# Patient Record
Sex: FEMALE | Race: WHITE | NOT HISPANIC OR LATINO
[De-identification: names, ages, dates, MRNs, and addresses within clinical notes are randomized per-mention and may not be internally consistent; named-entity substitution may affect disease eponyms.]

---

## 2017-02-21 ENCOUNTER — RX RENEWAL (OUTPATIENT)
Age: 82
End: 2017-02-21

## 2017-02-22 ENCOUNTER — RX RENEWAL (OUTPATIENT)
Age: 82
End: 2017-02-22

## 2017-05-10 ENCOUNTER — OUTPATIENT (OUTPATIENT)
Dept: OUTPATIENT SERVICES | Facility: HOSPITAL | Age: 82
LOS: 1 days | End: 2017-05-10
Payer: MEDICARE

## 2017-05-10 PROCEDURE — 78815 PET IMAGE W/CT SKULL-THIGH: CPT

## 2017-05-10 PROCEDURE — 70491 CT SOFT TISSUE NECK W/DYE: CPT

## 2017-05-10 PROCEDURE — 70491 CT SOFT TISSUE NECK W/DYE: CPT | Mod: 26

## 2017-05-10 PROCEDURE — A9552: CPT

## 2017-05-10 PROCEDURE — 78815 PET IMAGE W/CT SKULL-THIGH: CPT | Mod: 26

## 2017-05-23 ENCOUNTER — RESULT REVIEW (OUTPATIENT)
Age: 82
End: 2017-05-23

## 2017-05-23 ENCOUNTER — OUTPATIENT (OUTPATIENT)
Dept: OUTPATIENT SERVICES | Facility: HOSPITAL | Age: 82
LOS: 1 days | End: 2017-05-23
Payer: MEDICARE

## 2017-05-23 PROCEDURE — 10022: CPT | Mod: 59

## 2017-05-23 PROCEDURE — 76942 ECHO GUIDE FOR BIOPSY: CPT | Mod: 26,76

## 2017-05-23 PROCEDURE — 88173 CYTOPATH EVAL FNA REPORT: CPT

## 2017-05-23 PROCEDURE — 10022: CPT

## 2017-05-23 PROCEDURE — 76942 ECHO GUIDE FOR BIOPSY: CPT

## 2017-05-23 PROCEDURE — 88305 TISSUE EXAM BY PATHOLOGIST: CPT

## 2017-05-24 LAB
NON-GYNECOLOGICAL CYTOLOGY STUDY: SIGNIFICANT CHANGE UP
NON-GYNECOLOGICAL CYTOLOGY STUDY: SIGNIFICANT CHANGE UP

## 2017-05-31 ENCOUNTER — APPOINTMENT (OUTPATIENT)
Dept: OTOLARYNGOLOGY | Facility: CLINIC | Age: 82
End: 2017-05-31

## 2017-05-31 VITALS
DIASTOLIC BLOOD PRESSURE: 75 MMHG | HEIGHT: 63 IN | BODY MASS INDEX: 23.04 KG/M2 | WEIGHT: 130 LBS | SYSTOLIC BLOOD PRESSURE: 129 MMHG | HEART RATE: 76 BPM

## 2017-05-31 DIAGNOSIS — I25.10 ATHEROSCLEROTIC HEART DISEASE OF NATIVE CORONARY ARTERY W/OUT ANGINA PECTORIS: ICD-10-CM

## 2017-05-31 DIAGNOSIS — C79.51 SECONDARY MALIGNANT NEOPLASM OF BONE: ICD-10-CM

## 2017-05-31 DIAGNOSIS — D44.0 NEOPLASM OF UNCERTAIN BEHAVIOR OF THYROID GLAND: ICD-10-CM

## 2017-05-31 DIAGNOSIS — Z78.9 OTHER SPECIFIED HEALTH STATUS: ICD-10-CM

## 2017-05-31 DIAGNOSIS — Z80.9 FAMILY HISTORY OF MALIGNANT NEOPLASM, UNSPECIFIED: ICD-10-CM

## 2017-06-01 PROBLEM — D44.0 FOLLICULAR TUMOR OF THYROID GLAND (UNCERTAIN IF BENIGN OR MALIGNANT): Status: ACTIVE | Noted: 2017-06-01

## 2017-06-01 PROBLEM — C79.51: Status: ACTIVE | Noted: 2017-06-01

## 2017-06-05 PROBLEM — Z80.9 FAMILY HISTORY OF MALIGNANT NEOPLASM: Status: ACTIVE | Noted: 2017-05-31

## 2017-06-05 PROBLEM — I25.10 CORONARY DISEASE: Status: ACTIVE | Noted: 2017-05-31

## 2017-06-05 RX ORDER — APIXABAN 5 MG/1
5 TABLET, FILM COATED ORAL
Refills: 0 | Status: ACTIVE | COMMUNITY

## 2017-06-07 ENCOUNTER — APPOINTMENT (OUTPATIENT)
Dept: OTOLARYNGOLOGY | Facility: CLINIC | Age: 82
End: 2017-06-07

## 2017-06-07 VITALS
HEIGHT: 63 IN | SYSTOLIC BLOOD PRESSURE: 127 MMHG | DIASTOLIC BLOOD PRESSURE: 75 MMHG | WEIGHT: 130 LBS | HEART RATE: 84 BPM | BODY MASS INDEX: 23.04 KG/M2

## 2017-06-07 DIAGNOSIS — C44.301 UNSPECIFIED MALIGNANT NEOPLASM OF SKIN OF NOSE: ICD-10-CM

## 2017-06-08 VITALS
SYSTOLIC BLOOD PRESSURE: 139 MMHG | RESPIRATION RATE: 16 BRPM | HEART RATE: 80 BPM | DIASTOLIC BLOOD PRESSURE: 78 MMHG | WEIGHT: 130.07 LBS | HEIGHT: 63 IN | TEMPERATURE: 98 F

## 2017-06-08 RX ORDER — METOPROLOL TARTRATE 50 MG
1 TABLET ORAL
Qty: 0 | Refills: 0 | COMMUNITY

## 2017-06-08 NOTE — PATIENT PROFILE ADULT. - PMH
Atrial fibrillation    Hypothyroid    Pneumonia Atrial fibrillation    Bone cancer    Breast cancer  left  Hypothyroid    Pneumonia

## 2017-06-08 NOTE — PATIENT PROFILE ADULT. - PSH
S/P ablation of atrial fibrillation    S/P cataract surgery    S/P knee surgery  Right knee arthroscopy x1  S/P knee surgery  Left knee Arthrocopy X3  Surgery, elective  Thumb surgery bilateral S/P ablation of atrial fibrillation    S/P cataract surgery    S/P knee surgery  Left knee Arthrocopy X3  S/P knee surgery  Right knee arthroscopy x1  S/P lumpectomy, left breast    Surgery, elective  Thumb surgery bilateral

## 2017-06-09 ENCOUNTER — APPOINTMENT (OUTPATIENT)
Dept: OTOLARYNGOLOGY | Facility: HOSPITAL | Age: 82
End: 2017-06-09

## 2017-06-09 ENCOUNTER — RESULT REVIEW (OUTPATIENT)
Age: 82
End: 2017-06-09

## 2017-06-09 ENCOUNTER — INPATIENT (INPATIENT)
Facility: HOSPITAL | Age: 82
LOS: 4 days | Discharge: HOME | DRG: 516 | End: 2017-06-14
Attending: OTOLARYNGOLOGY | Admitting: OTOLARYNGOLOGY
Payer: MEDICARE

## 2017-06-09 DIAGNOSIS — Z98.49 CATARACT EXTRACTION STATUS, UNSPECIFIED EYE: Chronic | ICD-10-CM

## 2017-06-09 DIAGNOSIS — Z98.890 OTHER SPECIFIED POSTPROCEDURAL STATES: Chronic | ICD-10-CM

## 2017-06-09 DIAGNOSIS — Z41.9 ENCOUNTER FOR PROCEDURE FOR PURPOSES OTHER THAN REMEDYING HEALTH STATE, UNSPECIFIED: Chronic | ICD-10-CM

## 2017-06-09 DIAGNOSIS — Z90.12 ACQUIRED ABSENCE OF LEFT BREAST AND NIPPLE: Chronic | ICD-10-CM

## 2017-06-09 PROCEDURE — 71010: CPT | Mod: 26

## 2017-06-09 PROCEDURE — 41150 TONGUE MOUTH JAW SURGERY: CPT

## 2017-06-09 PROCEDURE — 14040 TIS TRNFR F/C/C/M/N/A/G/H/F: CPT

## 2017-06-09 RX ORDER — CEFAZOLIN SODIUM 1 G
1000 VIAL (EA) INJECTION EVERY 8 HOURS
Qty: 0 | Refills: 0 | Status: COMPLETED | OUTPATIENT
Start: 2017-06-09 | End: 2017-06-11

## 2017-06-09 RX ORDER — CHLORHEXIDINE GLUCONATE 213 G/1000ML
15 SOLUTION TOPICAL EVERY 6 HOURS
Qty: 0 | Refills: 0 | Status: DISCONTINUED | OUTPATIENT
Start: 2017-06-09 | End: 2017-06-14

## 2017-06-09 RX ORDER — MORPHINE SULFATE 50 MG/1
2 CAPSULE, EXTENDED RELEASE ORAL
Qty: 0 | Refills: 0 | Status: DISCONTINUED | OUTPATIENT
Start: 2017-06-09 | End: 2017-06-09

## 2017-06-09 RX ORDER — METOPROLOL TARTRATE 50 MG
25 TABLET ORAL DAILY
Qty: 0 | Refills: 0 | Status: DISCONTINUED | OUTPATIENT
Start: 2017-06-09 | End: 2017-06-13

## 2017-06-09 RX ORDER — OXYCODONE HYDROCHLORIDE 5 MG/1
5 TABLET ORAL EVERY 4 HOURS
Qty: 0 | Refills: 0 | Status: DISCONTINUED | OUTPATIENT
Start: 2017-06-09 | End: 2017-06-13

## 2017-06-09 RX ORDER — ACETAMINOPHEN 500 MG
650 TABLET ORAL EVERY 6 HOURS
Qty: 0 | Refills: 0 | Status: DISCONTINUED | OUTPATIENT
Start: 2017-06-09 | End: 2017-06-13

## 2017-06-09 RX ORDER — CEFAZOLIN SODIUM 1 G
1000 VIAL (EA) INJECTION EVERY 8 HOURS
Qty: 0 | Refills: 0 | Status: DISCONTINUED | OUTPATIENT
Start: 2017-06-09 | End: 2017-06-09

## 2017-06-09 RX ORDER — ONDANSETRON 8 MG/1
4 TABLET, FILM COATED ORAL EVERY 6 HOURS
Qty: 0 | Refills: 0 | Status: DISCONTINUED | OUTPATIENT
Start: 2017-06-09 | End: 2017-06-10

## 2017-06-09 RX ORDER — MORPHINE SULFATE 50 MG/1
2 CAPSULE, EXTENDED RELEASE ORAL
Qty: 0 | Refills: 0 | Status: DISCONTINUED | OUTPATIENT
Start: 2017-06-09 | End: 2017-06-12

## 2017-06-09 RX ORDER — SODIUM CHLORIDE 9 MG/ML
1000 INJECTION, SOLUTION INTRAVENOUS
Qty: 0 | Refills: 0 | Status: DISCONTINUED | OUTPATIENT
Start: 2017-06-09 | End: 2017-06-13

## 2017-06-09 RX ORDER — LEVOTHYROXINE SODIUM 125 MCG
75 TABLET ORAL DAILY
Qty: 0 | Refills: 0 | Status: DISCONTINUED | OUTPATIENT
Start: 2017-06-09 | End: 2017-06-13

## 2017-06-09 RX ORDER — OXYCODONE HYDROCHLORIDE 5 MG/1
8 TABLET ORAL EVERY 6 HOURS
Qty: 0 | Refills: 0 | Status: DISCONTINUED | OUTPATIENT
Start: 2017-06-09 | End: 2017-06-14

## 2017-06-09 RX ADMIN — MORPHINE SULFATE 2 MILLIGRAM(S): 50 CAPSULE, EXTENDED RELEASE ORAL at 23:42

## 2017-06-09 RX ADMIN — ONDANSETRON 104 MILLIGRAM(S): 8 TABLET, FILM COATED ORAL at 20:11

## 2017-06-09 RX ADMIN — MORPHINE SULFATE 2 MILLIGRAM(S): 50 CAPSULE, EXTENDED RELEASE ORAL at 21:24

## 2017-06-09 RX ADMIN — MORPHINE SULFATE 2 MILLIGRAM(S): 50 CAPSULE, EXTENDED RELEASE ORAL at 20:51

## 2017-06-09 RX ADMIN — MORPHINE SULFATE 2 MILLIGRAM(S): 50 CAPSULE, EXTENDED RELEASE ORAL at 20:49

## 2017-06-09 RX ADMIN — MORPHINE SULFATE 2 MILLIGRAM(S): 50 CAPSULE, EXTENDED RELEASE ORAL at 23:53

## 2017-06-09 RX ADMIN — Medication 100 MILLIGRAM(S): at 20:03

## 2017-06-09 RX ADMIN — MORPHINE SULFATE 2 MILLIGRAM(S): 50 CAPSULE, EXTENDED RELEASE ORAL at 20:31

## 2017-06-09 NOTE — PROGRESS NOTE ADULT - SUBJECTIVE AND OBJECTIVE BOX
ENT Post op check    Procedure: right SND 1-3 and right marginal mandibulectomy  Interim: seen in PACU. still tired from anesthesia. endorses jaw and neck pain. denies n/v/f/c. breathing comfortably.     PE:  NAD. no increased WOB.   no stridor  neck soft, flat. no erythema or ecchymosis. no e/o hematoma  Jaw incision intact. blood tinged saliva  NGT in place    A/P: 82F s/p right SND 1-3 and right marginal mandibulectomy  - ancef  - pain control prns  - anti emetics prns  - c/w home medications  - SQH in AM  - CRISTIANO mgmt  - oob and ambulate  - NPO

## 2017-06-09 NOTE — H&P ADULT - HISTORY OF PRESENT ILLNESS
Ms. Escamilla is an 82 year old female who recently underwent a biopsy of a right mandibular alveolar lesion surrounding a dental implant. The biopsy revealed well-differentiated squamous cell carcinoma. The patient is a nonsmoker but she has a long history of multiple lesions in the oral cavity which she treats with alternative medicine with the presumptive diagnosis of lichen planus. The patient complains of pain in the region of the recent biopsy.     Physical examination revealed a 3-cm exophytic lesion on the right alveolar ridge. It extends both buccally and lingually. It does not extend to the bottom of the mandible on the right side. There is an additional erythematous lesion, about 2.5 cm, in the right buccal mucosa as well as multiple whitish lesions on both sides of the oral cavity. There is nodularity in the right neck levels I and II.  Fiberoptic examination of the nasopharynx, oropharynx, hypopharynx, and larynx was unremarkable. The rest of the complete and comprehensive examination of the head, nose and ears was unremarkable.     I reviewed the management options with her including surgery, surgery followed with radiation therapy, radiation therapy or no treatment. I discussed advantages and disadvantages of each option. The surgery would be her best option and would include at minimum a wide resection with marginal mandibulectomy. Attempts can be made to preserve the continuity of the mandible and avoid major reconstructive procedures, but this cannot be guaranteed. Right modified neck dissection should also be performed. Risks were discussed with the patient which include but are not limited to bleeding, infection, persistent or recurrent tumor, cranial nerve injury especially cranial nerve VII, IX, X, XI, and XII, orocutaneous or pharyngocutaneous fistula, and need for additional surgery. All questions were answered and she is ready to proceed.

## 2017-06-10 RX ORDER — PREGABALIN 225 MG/1
100 CAPSULE ORAL DAILY
Qty: 0 | Refills: 0 | Status: DISCONTINUED | OUTPATIENT
Start: 2017-06-10 | End: 2017-06-13

## 2017-06-10 RX ORDER — FOLIC ACID 0.8 MG
1 TABLET ORAL DAILY
Qty: 0 | Refills: 0 | Status: DISCONTINUED | OUTPATIENT
Start: 2017-06-10 | End: 2017-06-13

## 2017-06-10 RX ORDER — HEPARIN SODIUM 5000 [USP'U]/ML
5000 INJECTION INTRAVENOUS; SUBCUTANEOUS EVERY 12 HOURS
Qty: 0 | Refills: 0 | Status: DISCONTINUED | OUTPATIENT
Start: 2017-06-10 | End: 2017-06-11

## 2017-06-10 RX ORDER — ONDANSETRON 8 MG/1
4 TABLET, FILM COATED ORAL EVERY 4 HOURS
Qty: 0 | Refills: 0 | Status: DISCONTINUED | OUTPATIENT
Start: 2017-06-10 | End: 2017-06-14

## 2017-06-10 RX ORDER — THIAMINE MONONITRATE (VIT B1) 100 MG
100 TABLET ORAL DAILY
Qty: 0 | Refills: 0 | Status: DISCONTINUED | OUTPATIENT
Start: 2017-06-10 | End: 2017-06-13

## 2017-06-10 RX ADMIN — Medication 1 TABLET(S): at 19:43

## 2017-06-10 RX ADMIN — Medication 100 MILLIGRAM(S): at 15:34

## 2017-06-10 RX ADMIN — Medication 100 MILLIGRAM(S): at 19:42

## 2017-06-10 RX ADMIN — Medication 25 MILLIGRAM(S): at 06:43

## 2017-06-10 RX ADMIN — Medication 75 MICROGRAM(S): at 06:43

## 2017-06-10 RX ADMIN — CHLORHEXIDINE GLUCONATE 15 MILLILITER(S): 213 SOLUTION TOPICAL at 06:42

## 2017-06-10 RX ADMIN — Medication 1 MILLIGRAM(S): at 19:42

## 2017-06-10 RX ADMIN — ONDANSETRON 104 MILLIGRAM(S): 8 TABLET, FILM COATED ORAL at 07:59

## 2017-06-10 RX ADMIN — CHLORHEXIDINE GLUCONATE 15 MILLILITER(S): 213 SOLUTION TOPICAL at 19:14

## 2017-06-10 RX ADMIN — Medication 100 MILLIGRAM(S): at 06:41

## 2017-06-10 RX ADMIN — HEPARIN SODIUM 5000 UNIT(S): 5000 INJECTION INTRAVENOUS; SUBCUTANEOUS at 19:13

## 2017-06-10 RX ADMIN — CHLORHEXIDINE GLUCONATE 15 MILLILITER(S): 213 SOLUTION TOPICAL at 12:17

## 2017-06-10 RX ADMIN — Medication 100 MILLIGRAM(S): at 22:14

## 2017-06-10 NOTE — PROGRESS NOTE ADULT - SUBJECTIVE AND OBJECTIVE BOX
ENT [facility] DAILY PROGRESS NOTE    Interval events: 82F s/p right SND 1-3 and right marginal mandibulectomy 6/9  POD1: Doing well. No acute e vents. pain controlled. NGT in correct position, starting TFs today.       MEDICATIONS:  Antiinfectives:   ceFAZolin   IVPB 1000milliGRAM(s) IV Intermittent every 8 hours    IV fluids:  lactated ringers. 1000milliLiter(s) IV Continuous <Continuous>    Hematologic/Anticoagulation:  heparin  Injectable 5000Unit(s) SubCutaneous every 12 hours    Pain medications/Neuro:  morphine  - Injectable 2milliGRAM(s) IV Push every 15 minutes PRN  oxyCODONE Concentrate 5milliGRAM(s) Oral every 4 hours PRN  acetaminophen    Suspension. 650milliGRAM(s) Oral every 6 hours PRN  ondansetron  IVPB 4milliGRAM(s) IV Intermittent every 6 hours PRN  oxyCODONE Solution 8milliGRAM(s) Oral every 6 hours PRN    Endocrine Medications:   levothyroxine 75MICROGram(s) Enteral Tube daily    All other standing medications:   chlorhexidine 0.12% Liquid 15milliLiter(s) Swish and Spit every 6 hours  metoprolol 25milliGRAM(s) Enteral Tube daily      Vital Signs Last 24 Hrs  T(C): 36.4, Max: 36.4 (06-10 @ 05:20)  T(F): 97.6, Max: 97.6 (06-10 @ 05:20)  HR: 88 (86 - 104)  BP: 133/73 (105/99 - 147/103)  BP(mean): 95 (88 - 95)  RR: 18 (13 - 26)  SpO2: 98% (95% - 99%)      I & Os for current day (as of 06-10 @ 08:16)  =============================================  IN:    lactated ringers.: 400 ml    IV PiggyBack: 50 ml    Total IN: 450 ml  ---------------------------------------------  OUT:    Voided: 250 ml    Drain: 90 ml    Total OUT: 340 ml  ---------------------------------------------  Total NET: 110 ml        PHYSICAL EXAM:  PE:  NAD. no increased WOB.   no stridor  neck soft, flat. no erythema or ecchymosis. no e/o hematoma  Jaw incision intact. blood tinged saliva  NGT in place  CRISTIANO in neck holding suction with SS fluid

## 2017-06-10 NOTE — PROGRESS NOTE ADULT - ASSESSMENT
82F s/p right SND 1-3 and right marginal mandibulectomy on 6/9, doing well post op  - ancef  - pain control prns  - anti emetics prns  - c/w home medications  - SQH in AM  - CRISTIANO mgmt  - oob and ambulate  - NPO with TFs through NGT

## 2017-06-10 NOTE — DIETITIAN INITIAL EVALUATION ADULT. - OTHER INFO
Pt is a 83y/o woman s/p right marginal mandibulectomy (6/9) for squamous cell carcinoma. Pt c/o of some pain. No N/V/D/C. Skin currently intact and CRISTIANO drain present. Pt has NKFA, but states she does not generally consume: bread, pasta, or meat (but she's not a vegetarian). Pt consumes EtOH almost daily. Pt states she prefers holistic and alternative medicines. Previous home supplements include (but not limited to): B1, B12, Omega 3, Vitamin C, Ca, Mg, Zn, Lutein. Current TF order: Jveity 1.2 @ 50mL/hr x 24hrs via NGT  = 1440kcla, 66g pro, 1200mL total volume, 968mL Free water, 100% RDIs. RD provided edu to pt regarding TF and nutrition goals.

## 2017-06-10 NOTE — DIETITIAN INITIAL EVALUATION ADULT. - NS AS NUTRI INTERV ENTERAL NUTRITION
Increase to: Jevity 1.2 @ 55mL/hr x 24hrs via NGT= 1584kcal, 73g pro(1.2g/kg), 1320mL total volume, 1065mL free water, 100% RDIs./Volume

## 2017-06-10 NOTE — DIETITIAN INITIAL EVALUATION ADULT. - ENERGY NEEDS
Ht: 63" IBW: 52kg, %IBW: 113. BMI: 23  ABW used for calculations as pt between % of IBW.   Needs estimated for Ca dx and s/p OR.

## 2017-06-11 LAB
ANION GAP SERPL CALC-SCNC: 10 MMOL/L — SIGNIFICANT CHANGE UP (ref 5–17)
BUN SERPL-MCNC: 14 MG/DL — SIGNIFICANT CHANGE UP (ref 7–23)
CALCIUM SERPL-MCNC: 9.2 MG/DL — SIGNIFICANT CHANGE UP (ref 8.4–10.5)
CHLORIDE SERPL-SCNC: 101 MMOL/L — SIGNIFICANT CHANGE UP (ref 96–108)
CO2 SERPL-SCNC: 27 MMOL/L — SIGNIFICANT CHANGE UP (ref 22–31)
CREAT SERPL-MCNC: 0.7 MG/DL — SIGNIFICANT CHANGE UP (ref 0.5–1.3)
GLUCOSE SERPL-MCNC: 90 MG/DL — SIGNIFICANT CHANGE UP (ref 70–99)
MAGNESIUM SERPL-MCNC: 1.8 MG/DL — SIGNIFICANT CHANGE UP (ref 1.6–2.6)
PHOSPHATE SERPL-MCNC: 1.9 MG/DL — LOW (ref 2.5–4.5)
POTASSIUM SERPL-MCNC: 4.1 MMOL/L — SIGNIFICANT CHANGE UP (ref 3.5–5.3)
POTASSIUM SERPL-SCNC: 4.1 MMOL/L — SIGNIFICANT CHANGE UP (ref 3.5–5.3)
SODIUM SERPL-SCNC: 138 MMOL/L — SIGNIFICANT CHANGE UP (ref 135–145)

## 2017-06-11 RX ORDER — APIXABAN 2.5 MG/1
2.5 TABLET, FILM COATED ORAL
Qty: 0 | Refills: 0 | Status: DISCONTINUED | OUTPATIENT
Start: 2017-06-11 | End: 2017-06-13

## 2017-06-11 RX ADMIN — CHLORHEXIDINE GLUCONATE 15 MILLILITER(S): 213 SOLUTION TOPICAL at 11:32

## 2017-06-11 RX ADMIN — Medication 1 TABLET(S): at 11:32

## 2017-06-11 RX ADMIN — CHLORHEXIDINE GLUCONATE 15 MILLILITER(S): 213 SOLUTION TOPICAL at 06:59

## 2017-06-11 RX ADMIN — Medication 25 MILLIGRAM(S): at 07:00

## 2017-06-11 RX ADMIN — Medication 1 MILLIGRAM(S): at 11:32

## 2017-06-11 RX ADMIN — SODIUM CHLORIDE 80 MILLILITER(S): 9 INJECTION, SOLUTION INTRAVENOUS at 17:36

## 2017-06-11 RX ADMIN — APIXABAN 2.5 MILLIGRAM(S): 2.5 TABLET, FILM COATED ORAL at 17:26

## 2017-06-11 RX ADMIN — Medication 100 MILLIGRAM(S): at 11:32

## 2017-06-11 RX ADMIN — PREGABALIN 100 MICROGRAM(S): 225 CAPSULE ORAL at 06:59

## 2017-06-11 RX ADMIN — SODIUM CHLORIDE 80 MILLILITER(S): 9 INJECTION, SOLUTION INTRAVENOUS at 00:43

## 2017-06-11 RX ADMIN — HEPARIN SODIUM 5000 UNIT(S): 5000 INJECTION INTRAVENOUS; SUBCUTANEOUS at 06:59

## 2017-06-11 RX ADMIN — CHLORHEXIDINE GLUCONATE 15 MILLILITER(S): 213 SOLUTION TOPICAL at 00:44

## 2017-06-11 RX ADMIN — Medication 100 MILLIGRAM(S): at 14:45

## 2017-06-11 RX ADMIN — Medication 75 MICROGRAM(S): at 07:00

## 2017-06-11 NOTE — PROGRESS NOTE ADULT - ASSESSMENT
82F s/p right SND 1-3 and right marginal mandibulectomy on 6/9, doing well post op  - ancef  - pain control prns  - anti emetics prns  - c/w home medications  - CRISTIANO mgmt  - oob and ambulate  - NPO with TFs through NGT 82F s/p right SND 1-3 and right marginal mandibulectomy on 6/9, doing well post op  - ancef  - pain control prns  - anti emetics prns  - restart eloquis  - c/w home medications  - CRISTIANO mgmt  - oob and ambulate  - NPO with TFs through NGT

## 2017-06-11 NOTE — PROGRESS NOTE ADULT - SUBJECTIVE AND OBJECTIVE BOX
ENT [facility] DAILY PROGRESS NOTE    Interval events: 82F s/p right SND 1-3 and right marginal mandibulectomy 6/9  POD1: Doing well. No acute e vents. pain controlled. NGT in correct position, starting TFs today.   POD2: TFs at goal. pain controlled. Slept well last night. No acute events    MEDICATIONS:  Antiinfectives:   ceFAZolin   IVPB 1000milliGRAM(s) IV Intermittent every 8 hours    IV fluids:  lactated ringers. 1000milliLiter(s) IV Continuous <Continuous>  cyanocobalamin 100MICROGram(s) Oral daily  thiamine 100milliGRAM(s) Oral daily  folic acid 1milliGRAM(s) Oral daily  multivitamin 1Tablet(s) Oral daily    Hematologic/Anticoagulation:  heparin  Injectable 5000Unit(s) SubCutaneous every 12 hours    Pain medications/Neuro:  morphine  - Injectable 2milliGRAM(s) IV Push every 15 minutes PRN  oxyCODONE Concentrate 5milliGRAM(s) Oral every 4 hours PRN  acetaminophen    Suspension. 650milliGRAM(s) Oral every 6 hours PRN  oxyCODONE Solution 8milliGRAM(s) Oral every 6 hours PRN  ondansetron Injectable 4milliGRAM(s) IV Push every 4 hours PRN    Endocrine Medications:   levothyroxine 75MICROGram(s) Enteral Tube daily    All other standing medications:   chlorhexidine 0.12% Liquid 15milliLiter(s) Swish and Spit every 6 hours  metoprolol 25milliGRAM(s) Enteral Tube daily        Vital Signs Last 24 Hrs  T(C): 37, Max: 37.2 (06-11 @ 05:14)  T(F): 98.6, Max: 99 (06-11 @ 05:14)  HR: 102 (80 - 102)  BP: 156/109 (122/76 - 156/109)  BP(mean): 127 (94 - 127)  RR: 17 (16 - 20)  SpO2: 93% (93% - 99%)    I & Os for 24h ending 06-11 @ 07:00  =============================================  IN:    lactated ringers.: 1665 ml    Free Water: 600 ml    Jevity: 210 ml    Enteral Tube Flush: 160 ml    IV PiggyBack: 100 ml    Total IN: 2735 ml  ---------------------------------------------  OUT:    Voided: 1650 ml    Drain: 100 ml    Total OUT: 1750 ml  ---------------------------------------------  Total NET: 985 ml    I & Os for current day (as of 06-11 @ 10:15)  =============================================  IN:    Total IN: 0 ml  ---------------------------------------------  OUT:    Voided: 500 ml    Drain: 30 ml    Total OUT: 530 ml  ---------------------------------------------  Total NET: -530 ml        PHYSICAL EXAM:    PE:  NAD. no increased WOB.   no stridor  neck soft, flat. no erythema or ecchymosis. no e/o hematoma  Jaw incision intact. blood tinged saliva  NGT in place  CRISTIANO in neck holding suction with SS fluid

## 2017-06-12 RX ORDER — SODIUM,POTASSIUM PHOSPHATES 278-250MG
1 POWDER IN PACKET (EA) ORAL
Qty: 0 | Refills: 0 | Status: COMPLETED | OUTPATIENT
Start: 2017-06-12 | End: 2017-06-13

## 2017-06-12 RX ADMIN — Medication 1 MILLIGRAM(S): at 10:38

## 2017-06-12 RX ADMIN — CHLORHEXIDINE GLUCONATE 15 MILLILITER(S): 213 SOLUTION TOPICAL at 06:51

## 2017-06-12 RX ADMIN — Medication 1 TABLET(S): at 10:38

## 2017-06-12 RX ADMIN — Medication 25 MILLIGRAM(S): at 06:51

## 2017-06-12 RX ADMIN — PREGABALIN 100 MICROGRAM(S): 225 CAPSULE ORAL at 10:38

## 2017-06-12 RX ADMIN — Medication 1 TABLET(S): at 17:13

## 2017-06-12 RX ADMIN — CHLORHEXIDINE GLUCONATE 15 MILLILITER(S): 213 SOLUTION TOPICAL at 10:37

## 2017-06-12 RX ADMIN — CHLORHEXIDINE GLUCONATE 15 MILLILITER(S): 213 SOLUTION TOPICAL at 00:57

## 2017-06-12 RX ADMIN — APIXABAN 2.5 MILLIGRAM(S): 2.5 TABLET, FILM COATED ORAL at 17:25

## 2017-06-12 RX ADMIN — Medication 75 MICROGRAM(S): at 06:51

## 2017-06-12 RX ADMIN — CHLORHEXIDINE GLUCONATE 15 MILLILITER(S): 213 SOLUTION TOPICAL at 17:13

## 2017-06-12 RX ADMIN — SODIUM CHLORIDE 80 MILLILITER(S): 9 INJECTION, SOLUTION INTRAVENOUS at 09:33

## 2017-06-12 RX ADMIN — Medication 1 TABLET(S): at 22:31

## 2017-06-12 RX ADMIN — Medication 100 MILLIGRAM(S): at 10:38

## 2017-06-12 RX ADMIN — APIXABAN 2.5 MILLIGRAM(S): 2.5 TABLET, FILM COATED ORAL at 06:51

## 2017-06-12 NOTE — PROGRESS NOTE ADULT - SUBJECTIVE AND OBJECTIVE BOX
ENT [facility] DAILY PROGRESS NOTE    Overnight events/Interval HPI: 82F s/p right SND 1-3 and right marginal mandibulectomy 6/9  POD1: Doing well. No acute e vents. pain controlled. NGT in correct position, starting TFs today.   POD2: TFs at goal. pain controlled. Slept well last night. No acute events  POD3: No acute events overnight, tolerating TF, CRISTIANO to high to meet criteria for removal      Allergies  sulfa drugs (Other)      MEDICATIONS:  Antiinfectives:     IV fluids:  lactated ringers. 1000milliLiter(s) IV Continuous <Continuous>  cyanocobalamin 100MICROGram(s) Oral daily  thiamine 100milliGRAM(s) Oral daily  folic acid 1milliGRAM(s) Oral daily  multivitamin 1Tablet(s) Oral daily    Hematologic/Anticoagulation:  apixaban 2.5milliGRAM(s) Oral two times a day    Pain medications/Neuro:  morphine  - Injectable 2milliGRAM(s) IV Push every 15 minutes PRN  oxyCODONE Concentrate 5milliGRAM(s) Oral every 4 hours PRN  acetaminophen    Suspension. 650milliGRAM(s) Oral every 6 hours PRN  oxyCODONE Solution 8milliGRAM(s) Oral every 6 hours PRN  ondansetron Injectable 4milliGRAM(s) IV Push every 4 hours PRN    Endocrine Medications:   levothyroxine 75MICROGram(s) Enteral Tube daily    All other standing medications:   chlorhexidine 0.12% Liquid 15milliLiter(s) Swish and Spit every 6 hours  metoprolol 25milliGRAM(s) Enteral Tube daily    All other PRN medications:      Vital Signs Last 24 Hrs  T(C): 36.3, Max: 36.8 (06-11 @ 21:40)  T(F): 97.4, Max: 98.2 (06-11 @ 21:40)  HR: 82 (82 - 104)  BP: 148/66 (142/79 - 160/99)  BP(mean): 111 (104 - 121)  RR: 18 (15 - 19)  SpO2: 96% (92% - 96%)    I & Os for 24h ending 06-12 @ 07:00  =============================================  IN:    lactated ringers.: 1840 ml    Jevity: 1150 ml    Enteral Tube Flush: 600 ml    Free Water: 600 ml    Total IN: 4190 ml  ---------------------------------------------  OUT:    Voided: 3500 ml    Drain: 90 ml    Stool: 4 ml    Total OUT: 3594 ml  ---------------------------------------------  Total NET: 596 ml    I & Os for current day (as of 06-12 @ 11:28)  =============================================  IN:    lactated ringers.: 240 ml    Jevity: 150 ml    Total IN: 390 ml  ---------------------------------------------  OUT:    Voided: 400 ml    Total OUT: 400 ml  ---------------------------------------------  Total NET: -10 ml        PHYSICAL EXAM:    ENT EXAM-   Constitutional: Well-developed, well-nourished.  No hoarseness.     Head:  normocephalic, atraumatic.   NAD. no increased WOB.   no stridor  neck soft, flat. no erythema or ecchymosis. no e/o hematoma  Jaw incision intact. blood tinged saliva  NGT in place  CRISTIANO in neck holding suction with SS fluid    LABS:  CBC-    BMP/CMP-  11 Jun 2017 11:13    138    |  101    |  14     ----------------------------<  90     4.1     |  27     |  0.70     Ca    9.2        11 Jun 2017 11:13  Phos  1.9       11 Jun 2017 11:13  Mg     1.8       11 Jun 2017 11:13      Assessment/Plan:  82F s/p right SND 1-3 and right marginal mandibulectomy on 6/9, doing well post op  - ancef  - pain control prns  - anti emetics prns  - c/w eloquis  - c/w home medications  - CRISTIANO mgmt  - oob and ambulate  - NPO with TFs through NGT  - plan to start on CLD tomorrow    - d/w attending MD who agrees with the above plan and also personally examined pt at bedside        PPX: SCDs, DVT ppx with SUBQ hep.    Dispo planning:   -Home care is not needed ENT [facility] DAILY PROGRESS NOTE    Overnight events/Interval HPI: 82F s/p right SND 1-3 and right marginal mandibulectomy 6/9  POD1: Doing well. No acute e vents. pain controlled. NGT in correct position, starting TFs today.   POD2: TFs at goal. pain controlled. Slept well last night. No acute events  POD3: No acute events overnight, tolerating TF, CRISTIANO to high to meet criteria for removal      Allergies  sulfa drugs (Other)      MEDICATIONS:  Antiinfectives:     IV fluids:  lactated ringers. 1000milliLiter(s) IV Continuous <Continuous>  cyanocobalamin 100MICROGram(s) Oral daily  thiamine 100milliGRAM(s) Oral daily  folic acid 1milliGRAM(s) Oral daily  multivitamin 1Tablet(s) Oral daily    Hematologic/Anticoagulation:  apixaban 2.5milliGRAM(s) Oral two times a day    Pain medications/Neuro:  morphine  - Injectable 2milliGRAM(s) IV Push every 15 minutes PRN  oxyCODONE Concentrate 5milliGRAM(s) Oral every 4 hours PRN  acetaminophen    Suspension. 650milliGRAM(s) Oral every 6 hours PRN  oxyCODONE Solution 8milliGRAM(s) Oral every 6 hours PRN  ondansetron Injectable 4milliGRAM(s) IV Push every 4 hours PRN    Endocrine Medications:   levothyroxine 75MICROGram(s) Enteral Tube daily    All other standing medications:   chlorhexidine 0.12% Liquid 15milliLiter(s) Swish and Spit every 6 hours  metoprolol 25milliGRAM(s) Enteral Tube daily    All other PRN medications:      Vital Signs Last 24 Hrs  T(C): 36.3, Max: 36.8 (06-11 @ 21:40)  T(F): 97.4, Max: 98.2 (06-11 @ 21:40)  HR: 82 (82 - 104)  BP: 148/66 (142/79 - 160/99)  BP(mean): 111 (104 - 121)  RR: 18 (15 - 19)  SpO2: 96% (92% - 96%)    I & Os for 24h ending 06-12 @ 07:00  =============================================  IN:    lactated ringers.: 1840 ml    Jevity: 1150 ml    Enteral Tube Flush: 600 ml    Free Water: 600 ml    Total IN: 4190 ml  ---------------------------------------------  OUT:    Voided: 3500 ml    Drain: 90 ml    Stool: 4 ml    Total OUT: 3594 ml  ---------------------------------------------  Total NET: 596 ml    I & Os for current day (as of 06-12 @ 11:28)  =============================================  IN:    lactated ringers.: 240 ml    Jevity: 150 ml    Total IN: 390 ml  ---------------------------------------------  OUT:    Voided: 400 ml    Total OUT: 400 ml  ---------------------------------------------  Total NET: -10 ml        PHYSICAL EXAM:    ENT EXAM-   Constitutional: Well-developed, well-nourished.  No hoarseness.     Head:  normocephalic, atraumatic.   NAD. no increased WOB.   no stridor  neck soft, flat. no erythema or ecchymosis. no e/o hematoma  Jaw incision intact. blood tinged saliva  NGT in place  CRISTIANO in neck holding suction with SS fluid    LABS:  CBC-    BMP/CMP-  11 Jun 2017 11:13    138    |  101    |  14     ----------------------------<  90     4.1     |  27     |  0.70     Ca    9.2        11 Jun 2017 11:13  Phos  1.9       11 Jun 2017 11:13  Mg     1.8       11 Jun 2017 11:13      Assessment/Plan:  82F s/p right SND 1-3 and right marginal mandibulectomy on 6/9, doing well post op  - ancef  - pain control prns  - anti emetics prns  - c/w eloquis  - c/w home medications  - CRISTIANO mgmt  - oob and ambulate  - NPO with TFs through NGT  - plan to start on CLD tomorrow    - d/w attending MD who agrees with the above plan         PPX: SCDs, DVT ppx with SUBQ hep.    Dispo planning:   -Home care is not needed

## 2017-06-13 LAB
ANION GAP SERPL CALC-SCNC: 12 MMOL/L — SIGNIFICANT CHANGE UP (ref 5–17)
BUN SERPL-MCNC: 9 MG/DL — SIGNIFICANT CHANGE UP (ref 7–23)
CALCIUM SERPL-MCNC: 9.7 MG/DL — SIGNIFICANT CHANGE UP (ref 8.4–10.5)
CHLORIDE SERPL-SCNC: 98 MMOL/L — SIGNIFICANT CHANGE UP (ref 96–108)
CO2 SERPL-SCNC: 27 MMOL/L — SIGNIFICANT CHANGE UP (ref 22–31)
CREAT SERPL-MCNC: 0.6 MG/DL — SIGNIFICANT CHANGE UP (ref 0.5–1.3)
GLUCOSE SERPL-MCNC: 109 MG/DL — HIGH (ref 70–99)
MAGNESIUM SERPL-MCNC: 1.8 MG/DL — SIGNIFICANT CHANGE UP (ref 1.6–2.6)
PHOSPHATE SERPL-MCNC: 3.5 MG/DL — SIGNIFICANT CHANGE UP (ref 2.5–4.5)
POTASSIUM SERPL-MCNC: 3.7 MMOL/L — SIGNIFICANT CHANGE UP (ref 3.5–5.3)
POTASSIUM SERPL-SCNC: 3.7 MMOL/L — SIGNIFICANT CHANGE UP (ref 3.5–5.3)
SODIUM SERPL-SCNC: 137 MMOL/L — SIGNIFICANT CHANGE UP (ref 135–145)

## 2017-06-13 RX ORDER — APIXABAN 2.5 MG/1
2.5 TABLET, FILM COATED ORAL
Qty: 0 | Refills: 0 | Status: DISCONTINUED | OUTPATIENT
Start: 2017-06-13 | End: 2017-06-14

## 2017-06-13 RX ORDER — OXYCODONE HYDROCHLORIDE 5 MG/1
5 TABLET ORAL EVERY 4 HOURS
Qty: 0 | Refills: 0 | Status: DISCONTINUED | OUTPATIENT
Start: 2017-06-13 | End: 2017-06-14

## 2017-06-13 RX ORDER — METOPROLOL TARTRATE 50 MG
25 TABLET ORAL DAILY
Qty: 0 | Refills: 0 | Status: DISCONTINUED | OUTPATIENT
Start: 2017-06-13 | End: 2017-06-14

## 2017-06-13 RX ORDER — ACETAMINOPHEN 500 MG
650 TABLET ORAL EVERY 6 HOURS
Qty: 0 | Refills: 0 | Status: DISCONTINUED | OUTPATIENT
Start: 2017-06-13 | End: 2017-06-14

## 2017-06-13 RX ORDER — PREGABALIN 225 MG/1
100 CAPSULE ORAL DAILY
Qty: 0 | Refills: 0 | Status: DISCONTINUED | OUTPATIENT
Start: 2017-06-13 | End: 2017-06-14

## 2017-06-13 RX ORDER — LEVOTHYROXINE SODIUM 125 MCG
75 TABLET ORAL DAILY
Qty: 0 | Refills: 0 | Status: DISCONTINUED | OUTPATIENT
Start: 2017-06-13 | End: 2017-06-14

## 2017-06-13 RX ORDER — FOLIC ACID 0.8 MG
1 TABLET ORAL DAILY
Qty: 0 | Refills: 0 | Status: DISCONTINUED | OUTPATIENT
Start: 2017-06-13 | End: 2017-06-14

## 2017-06-13 RX ORDER — OXYCODONE HYDROCHLORIDE 5 MG/1
5 TABLET ORAL EVERY 4 HOURS
Qty: 0 | Refills: 0 | Status: DISCONTINUED | OUTPATIENT
Start: 2017-06-13 | End: 2017-06-13

## 2017-06-13 RX ORDER — THIAMINE MONONITRATE (VIT B1) 100 MG
100 TABLET ORAL DAILY
Qty: 0 | Refills: 0 | Status: DISCONTINUED | OUTPATIENT
Start: 2017-06-13 | End: 2017-06-14

## 2017-06-13 RX ADMIN — Medication 1 TABLET(S): at 12:50

## 2017-06-13 RX ADMIN — PREGABALIN 100 MICROGRAM(S): 225 CAPSULE ORAL at 11:19

## 2017-06-13 RX ADMIN — Medication 25 MILLIGRAM(S): at 11:19

## 2017-06-13 RX ADMIN — APIXABAN 2.5 MILLIGRAM(S): 2.5 TABLET, FILM COATED ORAL at 17:29

## 2017-06-13 RX ADMIN — CHLORHEXIDINE GLUCONATE 15 MILLILITER(S): 213 SOLUTION TOPICAL at 06:37

## 2017-06-13 RX ADMIN — CHLORHEXIDINE GLUCONATE 15 MILLILITER(S): 213 SOLUTION TOPICAL at 00:54

## 2017-06-13 RX ADMIN — CHLORHEXIDINE GLUCONATE 15 MILLILITER(S): 213 SOLUTION TOPICAL at 11:17

## 2017-06-13 RX ADMIN — Medication 1 TABLET(S): at 09:16

## 2017-06-13 RX ADMIN — CHLORHEXIDINE GLUCONATE 15 MILLILITER(S): 213 SOLUTION TOPICAL at 17:30

## 2017-06-13 RX ADMIN — Medication 75 MICROGRAM(S): at 11:20

## 2017-06-13 RX ADMIN — CHLORHEXIDINE GLUCONATE 15 MILLILITER(S): 213 SOLUTION TOPICAL at 23:27

## 2017-06-13 RX ADMIN — Medication 100 MILLIGRAM(S): at 11:17

## 2017-06-13 RX ADMIN — Medication 1 TABLET(S): at 11:17

## 2017-06-13 RX ADMIN — Medication 1 MILLIGRAM(S): at 11:17

## 2017-06-13 NOTE — PROGRESS NOTE ADULT - SUBJECTIVE AND OBJECTIVE BOX
ENT [facility] DAILY PROGRESS NOTE    Overnight events/Interval HPI: 82F s/p right SND 1-3 and right marginal mandibulectomy 6/9  POD1: Doing well. No acute e vents. pain controlled. NGT in correct position, starting TFs today.   POD2: TFs at goal. pain controlled. Slept well last night. No acute events  POD3: No acute events overnight, tolerating TF, CRISTIANO to high to meet criteria for removal  POD4: NGT removed, CRISTIANO drain removed, pt started on clear liquid diet, pain well controlled      Allergies  sulfa drugs (Other)      MEDICATIONS:  Antiinfectives:     IV fluids:  cyanocobalamin 100MICROGram(s) Oral daily  thiamine 100milliGRAM(s) Oral daily  folic acid 1milliGRAM(s) Oral daily  multivitamin 1Tablet(s) Oral daily  potassium acid phosphate/sodium acid phosphate tablet (K-PHOS No. 2) 1Tablet(s) Oral four times a day with meals    Hematologic/Anticoagulation:  apixaban 2.5milliGRAM(s) Oral two times a day    Pain medications/Neuro:  oxyCODONE Concentrate 5milliGRAM(s) Oral every 4 hours PRN  acetaminophen    Suspension. 650milliGRAM(s) Oral every 6 hours PRN  oxyCODONE Solution 8milliGRAM(s) Oral every 6 hours PRN  ondansetron Injectable 4milliGRAM(s) IV Push every 4 hours PRN    Endocrine Medications:   levothyroxine 75MICROGram(s) Enteral Tube daily    All other standing medications:   chlorhexidine 0.12% Liquid 15milliLiter(s) Swish and Spit every 6 hours  metoprolol 25milliGRAM(s) Enteral Tube daily    All other PRN medications:      Vital Signs Last 24 Hrs  T(C): 36.4, Max: 36.5 (06-12 @ 18:00)  T(F): 97.6, Max: 97.7 (06-12 @ 18:00)  HR: 70 (70 - 102)  BP: 156/99 (143/78 - 156/99)  BP(mean): 122 (105 - 122)  RR: 16 (16 - 18)  SpO2: 94% (92% - 96%)      I & Os for current day (as of 06-13 @ 10:37)  =============================================  IN:    lactated ringers.: 1600 ml    Jevity: 1050 ml    Free Water: 1000 ml    Total IN: 3650 ml  ---------------------------------------------  OUT:    Voided: 1650 ml    Drain: 40 ml    Stool: 1 ml    Total OUT: 1691 ml  ---------------------------------------------  Total NET: 1959 ml        PHYSICAL EXAM:    ENT EXAM-   Constitutional: Well-developed, well-nourished.  No hoarseness.     Head:  normocephalic, atraumatic.   NAD. no increased WOB.   no stridor  neck soft, flat. no erythema or ecchymosis. no e/o hematoma  Jaw incision intact. blood tinged saliva  NGT in place  CRISTIANO in neck holding suction with SS fluid    LABS:  CBC-    BMP/CMP-  13 Jun 2017 07:19    137    |  98     |  9      ----------------------------<  109    3.7     |  27     |  0.60     Ca    9.7        13 Jun 2017 07:19  Phos  3.5       13 Jun 2017 07:19  Mg     1.8       13 Jun 2017 07:19      Coagulation Studies-    Endocrine Panel-  Calcium, Total Serum: 9.7 mg/dL (06-13 @ 07:19)        Assessment/Plan:  82y Female s/p right SND 1-3 and right marginal mandibulectomy on 6/9, doing well post op  - ancef  - pain control prns  - anti emetics prns  - c/w eloquis  - c/w home medications  - CRISTIANO drain removed  - oob and ambulate  - started on clear liquid diet  - will plan on dispo tomorrow depending on how pt tolerates diet today    - d/w attending MD Dr. Maguire who agrees with the above plan and also physically examined pt      PPX: SCDs, DVT ppx with SUBQ hep.    Dispo planning:   -Home care for post op wound checks, pt lives alone but will be staying with friends for first several days post op

## 2017-06-14 ENCOUNTER — TRANSCRIPTION ENCOUNTER (OUTPATIENT)
Age: 82
End: 2017-06-14

## 2017-06-14 VITALS
DIASTOLIC BLOOD PRESSURE: 76 MMHG | SYSTOLIC BLOOD PRESSURE: 123 MMHG | RESPIRATION RATE: 16 BRPM | TEMPERATURE: 98 F | OXYGEN SATURATION: 95 % | HEART RATE: 92 BPM

## 2017-06-14 PROCEDURE — 88331 PATH CONSLTJ SURG 1 BLK 1SPC: CPT

## 2017-06-14 PROCEDURE — 88305 TISSUE EXAM BY PATHOLOGIST: CPT

## 2017-06-14 PROCEDURE — 80048 BASIC METABOLIC PNL TOTAL CA: CPT

## 2017-06-14 PROCEDURE — 86900 BLOOD TYPING SEROLOGIC ABO: CPT

## 2017-06-14 PROCEDURE — 88300 SURGICAL PATH GROSS: CPT

## 2017-06-14 PROCEDURE — 86850 RBC ANTIBODY SCREEN: CPT

## 2017-06-14 PROCEDURE — 86901 BLOOD TYPING SEROLOGIC RH(D): CPT

## 2017-06-14 PROCEDURE — 36415 COLL VENOUS BLD VENIPUNCTURE: CPT

## 2017-06-14 PROCEDURE — 84100 ASSAY OF PHOSPHORUS: CPT

## 2017-06-14 PROCEDURE — 88309 TISSUE EXAM BY PATHOLOGIST: CPT

## 2017-06-14 PROCEDURE — 83735 ASSAY OF MAGNESIUM: CPT

## 2017-06-14 PROCEDURE — 71045 X-RAY EXAM CHEST 1 VIEW: CPT

## 2017-06-14 RX ORDER — PREGABALIN 225 MG/1
1 CAPSULE ORAL
Qty: 30 | Refills: 0 | OUTPATIENT
Start: 2017-06-14 | End: 2017-07-14

## 2017-06-14 RX ORDER — APIXABAN 2.5 MG/1
0 TABLET, FILM COATED ORAL
Qty: 0 | Refills: 0 | COMMUNITY

## 2017-06-14 RX ORDER — THIAMINE MONONITRATE (VIT B1) 100 MG
1 TABLET ORAL
Qty: 30 | Refills: 0 | OUTPATIENT
Start: 2017-06-14 | End: 2017-07-14

## 2017-06-14 RX ORDER — DOCUSATE SODIUM 100 MG
1 CAPSULE ORAL
Qty: 60 | Refills: 0 | OUTPATIENT
Start: 2017-06-14 | End: 2017-07-14

## 2017-06-14 RX ORDER — LEVOTHYROXINE SODIUM 125 MCG
0 TABLET ORAL
Qty: 0 | Refills: 0 | COMMUNITY

## 2017-06-14 RX ORDER — METOPROLOL TARTRATE 50 MG
0 TABLET ORAL
Qty: 0 | Refills: 0 | COMMUNITY

## 2017-06-14 RX ORDER — CHLORHEXIDINE GLUCONATE 213 G/1000ML
5 SOLUTION TOPICAL
Qty: 1 | Refills: 0 | OUTPATIENT
Start: 2017-06-14

## 2017-06-14 RX ORDER — FOLIC ACID 0.8 MG
1 TABLET ORAL
Qty: 30 | Refills: 0 | OUTPATIENT
Start: 2017-06-14 | End: 2017-07-14

## 2017-06-14 RX ADMIN — Medication 25 MILLIGRAM(S): at 06:03

## 2017-06-14 RX ADMIN — Medication 75 MICROGRAM(S): at 06:03

## 2017-06-14 RX ADMIN — APIXABAN 2.5 MILLIGRAM(S): 2.5 TABLET, FILM COATED ORAL at 06:03

## 2017-06-14 RX ADMIN — CHLORHEXIDINE GLUCONATE 15 MILLILITER(S): 213 SOLUTION TOPICAL at 06:03

## 2017-06-14 NOTE — DISCHARGE NOTE ADULT - PATIENT PORTAL LINK FT
“You can access the [employer] Patient Portal, offered by [facility], by registering with the following website: [email or web address]”

## 2017-06-14 NOTE — DISCHARGE NOTE ADULT - CARE PLAN
Principal Discharge DX:	Bone cancer  Goal:	Recover from surgery  Instructions for follow-up, activity and diet:	1.	Report any fever, chills, difficulty breathing, difficulty swallowing, and change in your voice, bleeding or purulent drainage from your incision sites, or any significant swelling to your neck to the doctor immediately.  2.	Diet: Pureed diet  3.	Activity: no heavy lifting, do not lift anything heavier than a gallon of milk until after you follow up appointment with your doctor, no strenuous activity such as running or biking.  4.	Shower/Bathing: you shower starting 48 hrs after you procedure, after 48 hrs you may wash your hair and shower but do not scrub at your neck incision  5.	Wound care: keep your incision site clean and dry   6.	Take all medications as prescribed.   7.	Continue all of your normal home medications unless told otherwise.  8.	Avoid any NSAIDS or ibuprofen/asa containing products you may take Tylenol for mild pain.

## 2017-06-14 NOTE — DISCHARGE NOTE ADULT - CARE PROVIDER_API CALL
Andrea Bright), Otolaryngology  [address] [place] [address]  Phone: [phone]  Fax: [phone]    Jorje Casanova), [facility]; Otolaryngology  [address] [place] [address]  Phone: [phone]  Fax: [phone]

## 2017-06-14 NOTE — DISCHARGE NOTE ADULT - CARE PROVIDERS DIRECT ADDRESSES
[email or web address]

## 2017-06-14 NOTE — DISCHARGE NOTE ADULT - HOSPITAL COURSE
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 Drain was placed intraop.   POD1: Doing well. No acute e vents. pain controlled. NGT in correct position, starting TFs today.   POD2: TFs at goal. pain controlled. Slept well last night. 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 Drain was placed intraop.   POD1: Doing well. No acute e vents. pain controlled. NGT in correct position, starting TFs today.   POD2: TFs at goal. pain controlled. Slept well last night. No acute events  POD3: No acute events overnight, tolerating TF, CRISTIANO to high to meet criteria for removal  POD4: NGT removed, CRISTIANO drain removed, pt started on clear liquid diet, pain well controlled  POD5: Tolerating diet, ambulating, pain controlled, safe for discharge    Discharge Exam  ENT EXAM-   Constitutional: Well-developed, well-nourished.  No hoarseness.     Head:  normocephalic, atraumatic.   NAD. no increased WOB.   no stridor  neck soft, flat. no erythema or ecchymosis. no e/o hematoma  Jaw incision intact.

## 2017-06-14 NOTE — DISCHARGE NOTE ADULT - PLAN OF CARE
Recover from surgery 1.	Report any fever, chills, difficulty breathing, difficulty swallowing, and change in your voice, bleeding or purulent drainage from your incision sites, or any significant swelling to your neck to the doctor immediately.  2.	Diet: Pureed diet  3.	Activity: no heavy lifting, do not lift anything heavier than a gallon of milk until after you follow up appointment with your doctor, no strenuous activity such as running or biking.  4.	Shower/Bathing: you shower starting 48 hrs after you procedure, after 48 hrs you may wash your hair and shower but do not scrub at your neck incision  5.	Wound care: keep your incision site clean and dry   6.	Take all medications as prescribed.   7.	Continue all of your normal home medications unless told otherwise.  8.	Avoid any NSAIDS or ibuprofen/asa containing products you may take Tylenol for mild pain.

## 2017-06-14 NOTE — DISCHARGE NOTE ADULT - MEDICATION SUMMARY - MEDICATIONS TO TAKE
I will START or STAY ON the medications listed below when I get home from the hospital:    Percocet 5/325 oral tablet  -- 1 tab(s) by mouth every 6 hours MDD:4000mg of tylenol  -- Caution federal law prohibits the transfer of this drug to any person other  than the person for whom it was prescribed.  May cause drowsiness.  Alcohol may intensify this effect.  Use care when operating dangerous machinery.  This prescription cannot be refilled.  This product contains acetaminophen.  Do not use  with any other product containing acetaminophen to prevent possible liver damage.  Using more of this medication than prescribed may cause serious breathing problems.    -- Indication: For Pain control    chlorhexidine 0.12% mucous membrane liquid  -- 5 milliliter(s) mucous membrane 4 times a day after meals  -- Indication: For Mouth rinses for oral hygiene    Colace 100 mg oral capsule  -- 1 cap(s) by mouth every 12 hours MDD:for constipation  -- Medication should be taken with plenty of water.    -- Indication: For As needed for constipation    Multiple Vitamins oral tablet  -- 1 tab(s) by mouth once a day  -- Indication: For nutritional health    thiamine 100 mg oral tablet  -- 1 tab(s) by mouth once a day  -- Indication: For nutritional health    folic acid 1 mg oral tablet  -- 1 tab(s) by mouth once a day  -- Indication: For nutritional health    cyanocobalamin 100 mcg oral tablet  -- 1 tab(s) by mouth once a day  -- Indication: For nutritional health

## 2017-06-18 DIAGNOSIS — C03.1 MALIGNANT NEOPLASM OF LOWER GUM: ICD-10-CM

## 2017-06-18 DIAGNOSIS — I34.0 NONRHEUMATIC MITRAL (VALVE) INSUFFICIENCY: ICD-10-CM

## 2017-06-18 DIAGNOSIS — C79.89 SECONDARY MALIGNANT NEOPLASM OF OTHER SPECIFIED SITES: ICD-10-CM

## 2017-06-18 DIAGNOSIS — C41.1 MALIGNANT NEOPLASM OF MANDIBLE: ICD-10-CM

## 2017-06-18 DIAGNOSIS — I48.2 CHRONIC ATRIAL FIBRILLATION: ICD-10-CM

## 2017-06-18 DIAGNOSIS — Z79.01 LONG TERM (CURRENT) USE OF ANTICOAGULANTS: ICD-10-CM

## 2017-06-18 DIAGNOSIS — E03.9 HYPOTHYROIDISM, UNSPECIFIED: ICD-10-CM

## 2017-06-18 DIAGNOSIS — Z85.3 PERSONAL HISTORY OF MALIGNANT NEOPLASM OF BREAST: ICD-10-CM

## 2017-06-18 DIAGNOSIS — I10 ESSENTIAL (PRIMARY) HYPERTENSION: ICD-10-CM

## 2017-06-19 DIAGNOSIS — C77.0 SECONDARY AND UNSPECIFIED MALIGNANT NEOPLASM OF LYMPH NODES OF HEAD, FACE AND NECK: ICD-10-CM

## 2017-06-19 LAB — SURGICAL PATHOLOGY STUDY: SIGNIFICANT CHANGE UP

## 2017-06-23 PROBLEM — I48.91 UNSPECIFIED ATRIAL FIBRILLATION: Chronic | Status: ACTIVE | Noted: 2017-06-08

## 2017-06-23 PROBLEM — J18.9 PNEUMONIA, UNSPECIFIED ORGANISM: Chronic | Status: ACTIVE | Noted: 2017-06-08

## 2017-06-23 PROBLEM — C50.919 MALIGNANT NEOPLASM OF UNSPECIFIED SITE OF UNSPECIFIED FEMALE BREAST: Chronic | Status: ACTIVE | Noted: 2017-06-08

## 2017-06-23 PROBLEM — C41.9 MALIGNANT NEOPLASM OF BONE AND ARTICULAR CARTILAGE, UNSPECIFIED: Chronic | Status: ACTIVE | Noted: 2017-06-08

## 2017-06-23 PROBLEM — E03.9 HYPOTHYROIDISM, UNSPECIFIED: Chronic | Status: ACTIVE | Noted: 2017-06-08

## 2017-06-29 ENCOUNTER — APPOINTMENT (OUTPATIENT)
Dept: OTOLARYNGOLOGY | Facility: CLINIC | Age: 82
End: 2017-06-29

## 2017-06-29 VITALS
SYSTOLIC BLOOD PRESSURE: 148 MMHG | HEIGHT: 63 IN | DIASTOLIC BLOOD PRESSURE: 99 MMHG | HEART RATE: 81 BPM | BODY MASS INDEX: 22.15 KG/M2 | WEIGHT: 125 LBS

## 2017-06-29 DIAGNOSIS — R13.10 DYSPHAGIA, UNSPECIFIED: ICD-10-CM

## 2017-06-29 DIAGNOSIS — R49.0 DYSPHONIA: ICD-10-CM

## 2017-08-14 ENCOUNTER — APPOINTMENT (OUTPATIENT)
Dept: OTOLARYNGOLOGY | Facility: CLINIC | Age: 82
End: 2017-08-14
Payer: MEDICARE

## 2017-08-14 VITALS
HEART RATE: 103 BPM | DIASTOLIC BLOOD PRESSURE: 94 MMHG | WEIGHT: 127 LBS | HEIGHT: 63 IN | BODY MASS INDEX: 22.5 KG/M2 | SYSTOLIC BLOOD PRESSURE: 141 MMHG

## 2017-08-14 PROCEDURE — 99024 POSTOP FOLLOW-UP VISIT: CPT

## 2017-08-24 VITALS — DIASTOLIC BLOOD PRESSURE: 94 MMHG | SYSTOLIC BLOOD PRESSURE: 142 MMHG | HEART RATE: 82 BPM | OXYGEN SATURATION: 98 %

## 2017-08-24 DIAGNOSIS — I48.91 UNSPECIFIED ATRIAL FIBRILLATION: ICD-10-CM

## 2017-08-24 DIAGNOSIS — C41.1 MALIGNANT NEOPLASM OF MANDIBLE: ICD-10-CM

## 2017-08-24 DIAGNOSIS — E03.9 HYPOTHYROIDISM, UNSPECIFIED: ICD-10-CM

## 2017-08-24 DIAGNOSIS — Z80.3 FAMILY HISTORY OF MALIGNANT NEOPLASM OF BREAST: ICD-10-CM

## 2017-08-28 PROBLEM — C41.1: Status: ACTIVE | Noted: 2017-08-15

## 2017-10-16 ENCOUNTER — APPOINTMENT (OUTPATIENT)
Dept: OTOLARYNGOLOGY | Facility: CLINIC | Age: 82
End: 2017-10-16

## 2018-07-17 ENCOUNTER — OUTPATIENT (OUTPATIENT)
Dept: OUTPATIENT SERVICES | Facility: HOSPITAL | Age: 83
LOS: 1 days | End: 2018-07-17
Payer: MEDICARE

## 2018-07-17 DIAGNOSIS — Z98.890 OTHER SPECIFIED POSTPROCEDURAL STATES: Chronic | ICD-10-CM

## 2018-07-17 DIAGNOSIS — Z98.49 CATARACT EXTRACTION STATUS, UNSPECIFIED EYE: Chronic | ICD-10-CM

## 2018-07-17 DIAGNOSIS — Z41.9 ENCOUNTER FOR PROCEDURE FOR PURPOSES OTHER THAN REMEDYING HEALTH STATE, UNSPECIFIED: Chronic | ICD-10-CM

## 2018-07-17 DIAGNOSIS — Z90.12 ACQUIRED ABSENCE OF LEFT BREAST AND NIPPLE: Chronic | ICD-10-CM

## 2018-07-17 LAB — GLUCOSE BLDC GLUCOMTR-MCNC: 89 MG/DL — SIGNIFICANT CHANGE UP (ref 70–99)

## 2018-07-17 PROCEDURE — 78815 PET IMAGE W/CT SKULL-THIGH: CPT

## 2018-07-17 PROCEDURE — 82962 GLUCOSE BLOOD TEST: CPT

## 2018-07-17 PROCEDURE — 78815 PET IMAGE W/CT SKULL-THIGH: CPT | Mod: 26

## 2018-07-17 PROCEDURE — A9552: CPT

## 2018-07-22 PROBLEM — Z78.9 ALCOHOL USE: Status: ACTIVE | Noted: 2017-05-31

## 2018-08-29 ENCOUNTER — APPOINTMENT (OUTPATIENT)
Dept: OPHTHALMOLOGY | Facility: CLINIC | Age: 83
End: 2018-08-29
Payer: MEDICARE

## 2018-08-29 PROCEDURE — 92004 COMPRE OPH EXAM NEW PT 1/>: CPT

## 2018-09-25 ENCOUNTER — APPOINTMENT (OUTPATIENT)
Dept: OPHTHALMOLOGY | Facility: CLINIC | Age: 83
End: 2018-09-25
Payer: MEDICARE

## 2018-09-25 PROCEDURE — 66821 AFTER CATARACT LASER SURGERY: CPT | Mod: RT

## 2018-10-04 ENCOUNTER — APPOINTMENT (OUTPATIENT)
Dept: OPHTHALMOLOGY | Facility: CLINIC | Age: 83
End: 2018-10-04
Payer: MEDICARE

## 2018-10-04 PROCEDURE — 99024 POSTOP FOLLOW-UP VISIT: CPT

## 2019-04-11 ENCOUNTER — APPOINTMENT (OUTPATIENT)
Dept: OPHTHALMOLOGY | Facility: CLINIC | Age: 84
End: 2019-04-11
Payer: MEDICARE

## 2019-04-11 DIAGNOSIS — H35.3120 NONEXUDATIVE AGE-RELATED MACULAR DEGENERATION, LEFT EYE, STAGE UNSPECIFIED: ICD-10-CM

## 2019-04-11 PROCEDURE — 92286 ANT SGM IMG I&R SPECLR MIC: CPT

## 2019-04-11 PROCEDURE — 92012 INTRM OPH EXAM EST PATIENT: CPT

## 2019-04-19 ENCOUNTER — APPOINTMENT (OUTPATIENT)
Dept: OPHTHALMOLOGY | Facility: CLINIC | Age: 84
End: 2019-04-19
Payer: MEDICARE

## 2019-04-19 DIAGNOSIS — H34.8312 TRIBUTARY (BRANCH) RETINAL VEIN OCCLUSION, RIGHT EYE, STABLE: ICD-10-CM

## 2019-04-19 DIAGNOSIS — H35.62 RETINAL HEMORRHAGE, LEFT EYE: ICD-10-CM

## 2019-04-19 DIAGNOSIS — H35.3132 NONEXUDATIVE AGE-RELATED MACULAR DEGENERATION, BILATERAL, INTERMEDIATE DRY STAGE: ICD-10-CM

## 2019-04-19 DIAGNOSIS — H18.51 ENDOTHELIAL CORNEAL DYSTROPHY: ICD-10-CM

## 2019-04-19 DIAGNOSIS — Z96.1 PRESENCE OF INTRAOCULAR LENS: ICD-10-CM

## 2019-04-19 DIAGNOSIS — H26.493 OTHER SECONDARY CATARACT, BILATERAL: ICD-10-CM

## 2019-04-19 DIAGNOSIS — H34.8322 TRIBUTARY (BRANCH) RETINAL VEIN OCCLUSION, LEFT EYE, STABLE: ICD-10-CM

## 2019-04-19 DIAGNOSIS — H43.813 VITREOUS DEGENERATION, BILATERAL: ICD-10-CM

## 2019-04-19 PROCEDURE — 92014 COMPRE OPH EXAM EST PT 1/>: CPT

## 2019-04-19 PROCEDURE — 92134 CPTRZ OPH DX IMG PST SGM RTA: CPT

## 2019-04-19 PROCEDURE — 92225: CPT | Mod: RT

## 2019-09-13 ENCOUNTER — OUTPATIENT (OUTPATIENT)
Dept: OUTPATIENT SERVICES | Facility: HOSPITAL | Age: 84
LOS: 1 days | End: 2019-09-13
Payer: MEDICARE

## 2019-09-13 DIAGNOSIS — Z98.890 OTHER SPECIFIED POSTPROCEDURAL STATES: Chronic | ICD-10-CM

## 2019-09-13 DIAGNOSIS — Z98.49 CATARACT EXTRACTION STATUS, UNSPECIFIED EYE: Chronic | ICD-10-CM

## 2019-09-13 DIAGNOSIS — Z90.12 ACQUIRED ABSENCE OF LEFT BREAST AND NIPPLE: Chronic | ICD-10-CM

## 2019-09-13 DIAGNOSIS — Z41.9 ENCOUNTER FOR PROCEDURE FOR PURPOSES OTHER THAN REMEDYING HEALTH STATE, UNSPECIFIED: Chronic | ICD-10-CM

## 2019-09-13 LAB — GLUCOSE BLDC GLUCOMTR-MCNC: 78 MG/DL — SIGNIFICANT CHANGE UP (ref 70–99)

## 2019-09-13 PROCEDURE — A9552: CPT

## 2019-09-13 PROCEDURE — 78815 PET IMAGE W/CT SKULL-THIGH: CPT | Mod: 26

## 2019-09-13 PROCEDURE — 78815 PET IMAGE W/CT SKULL-THIGH: CPT

## 2019-09-13 PROCEDURE — 82962 GLUCOSE BLOOD TEST: CPT

## 2019-10-22 ENCOUNTER — NON-APPOINTMENT (OUTPATIENT)
Age: 84
End: 2019-10-22

## 2019-10-22 ENCOUNTER — APPOINTMENT (OUTPATIENT)
Dept: OPHTHALMOLOGY | Facility: CLINIC | Age: 84
End: 2019-10-22
Payer: MEDICARE

## 2019-10-22 PROCEDURE — 92014 COMPRE OPH EXAM EST PT 1/>: CPT

## 2019-10-31 ENCOUNTER — APPOINTMENT (OUTPATIENT)
Dept: OPHTHALMOLOGY | Facility: CLINIC | Age: 84
End: 2019-10-31
Payer: MEDICARE

## 2019-10-31 ENCOUNTER — NON-APPOINTMENT (OUTPATIENT)
Age: 84
End: 2019-10-31

## 2019-10-31 PROCEDURE — 92250 FUNDUS PHOTOGRAPHY W/I&R: CPT

## 2019-10-31 PROCEDURE — 92014 COMPRE OPH EXAM EST PT 1/>: CPT

## 2019-10-31 PROCEDURE — 92235 FLUORESCEIN ANGRPH MLTIFRAME: CPT

## 2020-01-07 ENCOUNTER — NON-APPOINTMENT (OUTPATIENT)
Age: 85
End: 2020-01-07

## 2020-01-07 ENCOUNTER — APPOINTMENT (OUTPATIENT)
Dept: OPHTHALMOLOGY | Facility: CLINIC | Age: 85
End: 2020-01-07
Payer: MEDICARE

## 2020-01-07 PROCEDURE — 92014 COMPRE OPH EXAM EST PT 1/>: CPT

## 2020-03-04 ENCOUNTER — APPOINTMENT (OUTPATIENT)
Dept: OTOLARYNGOLOGY | Facility: CLINIC | Age: 85
End: 2020-03-04
Payer: MEDICARE

## 2020-03-04 VITALS
TEMPERATURE: 97.9 F | WEIGHT: 130 LBS | SYSTOLIC BLOOD PRESSURE: 166 MMHG | OXYGEN SATURATION: 99 % | BODY MASS INDEX: 23.04 KG/M2 | HEIGHT: 63 IN | DIASTOLIC BLOOD PRESSURE: 84 MMHG | HEART RATE: 99 BPM

## 2020-03-04 DIAGNOSIS — Z87.39 PERSONAL HISTORY OF OTHER DISEASES OF THE MUSCULOSKELETAL SYSTEM AND CONNECTIVE TISSUE: ICD-10-CM

## 2020-03-04 DIAGNOSIS — Z78.9 OTHER SPECIFIED HEALTH STATUS: ICD-10-CM

## 2020-03-04 DIAGNOSIS — E07.9 DISORDER OF THYROID, UNSPECIFIED: ICD-10-CM

## 2020-03-04 DIAGNOSIS — R13.14 DYSPHAGIA, PHARYNGOESOPHAGEAL PHASE: ICD-10-CM

## 2020-03-04 PROCEDURE — 99214 OFFICE O/P EST MOD 30 MIN: CPT | Mod: 25

## 2020-03-04 PROCEDURE — 31575 DIAGNOSTIC LARYNGOSCOPY: CPT

## 2020-03-04 NOTE — PHYSICAL EXAM
[Midline] : trachea located in midline position [de-identified] : edentulous [Normal] : no rashes

## 2020-03-04 NOTE — PROCEDURE
[Dysphagia] : dysphagia not clearly evaluated by indirect laryngoscopy [Complicated Symptoms] : complicated symptoms requiring more thorough examination than provided by mirror [Topical Lidocaine] : topical lidocaine [Flexible Endoscope] : examined with the flexible endoscope [Serial Number: ___] : Serial Number: [unfilled] [Normal] : posterior cricoid area had healthy pink mucosa in the interarytenoid area and the esophageal inlet [de-identified] : done for dysphagia with h/o ca - clear

## 2020-03-04 NOTE — HISTORY OF PRESENT ILLNESS
[de-identified] : 84 yo woman nonsmoker. 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 She said she has been getting annual pet scan by Dr Bright and last scan had abnormality for which she underwent fna and was told she had no ca. She explains that Dr Bright retired and did not give her recommendation for otolaryngologist to follow up with and that she is reluctant to see Dr Maicol Jacob because he has recommended radiation and she does not wish to have it. She will not allow me to discuss her case with Dr Maicol Jacob and wishes to be followed for her disease. She is currently having mild difficulty swallowing and explains that this is long term. She also wished to have her thyroid addressed.\par

## 2020-03-09 ENCOUNTER — APPOINTMENT (OUTPATIENT)
Dept: OTOLARYNGOLOGY | Facility: CLINIC | Age: 85
End: 2020-03-09
Payer: MEDICARE

## 2020-03-09 VITALS
SYSTOLIC BLOOD PRESSURE: 170 MMHG | HEART RATE: 103 BPM | DIASTOLIC BLOOD PRESSURE: 85 MMHG | WEIGHT: 130 LBS | TEMPERATURE: 97.5 F | BODY MASS INDEX: 23.04 KG/M2 | OXYGEN SATURATION: 98 % | HEIGHT: 63 IN

## 2020-03-09 PROCEDURE — 99214 OFFICE O/P EST MOD 30 MIN: CPT

## 2020-03-10 NOTE — ASSESSMENT
[FreeTextEntry1] : 85F nonsmoker  was treated for T1N1 right alveolar ridge SCC in 2017 with marginal mandibulectomy and SND by Dr. Bright and Dr. Casanova. No XRT post op- patient refused XRT.  No evidence of disease on today's exam.  \par \par PET scan in September 2019 : Increased FDG activity within 0.8 cm soft tissue density inferior to right parotid gland which may represent a periparotid lymph node which is stable in size from prior exam. Given the stability in size and the location, the findings are favored to represent inflammatory changes. PET scan also showed stable 3.1 cm thyroid nodule- biopsy was done as per patient and was told benign.\par \par - CT-Neck for right neck 0.8 cm nodule follow up\par - Patient to fax thyroid biopsy report

## 2020-03-10 NOTE — HISTORY OF PRESENT ILLNESS
[de-identified] : 85F nonsmoker  was treated for T1N1 right alveolar ridge SCC in 2017 with marginal mandibulectomy and SND by Dr. Bright and Dr. Casanova. Patient did not get XRT post op- patient refused XRT. Patient is presenting to continue surveillance for her oral cancer. SHe had the last PET scan in September 2019 : Increased FDG activity within 0.8 cm soft tissue density inferior to right parotid gland which may represent a periparotid lymph node which is stable in size from prior exam. Given the stability in size and the location, the findings are favored to represent inflammatory changes. PET scan also showed stable 3.1 cm thyroid nodule. [FreeTextEntry1] : Patient reports no new pain, odynophagia, dysphagia, voice change, neck mass or weight loss.

## 2020-03-10 NOTE — REASON FOR VISIT
[Initial Evaluation] : an initial evaluation for [FreeTextEntry2] : oral cavity cancer for surveillance

## 2020-03-10 NOTE — PHYSICAL EXAM
[Midline] : trachea located in midline position [Normal] : orientation to person, place, and time: normal [de-identified] : ND scar healed well [de-identified] : Right marginal mandibulectomy , right buccal mucosa scar tissue

## 2020-04-20 ENCOUNTER — NON-APPOINTMENT (OUTPATIENT)
Age: 85
End: 2020-04-20

## 2020-04-20 ENCOUNTER — APPOINTMENT (OUTPATIENT)
Dept: OPHTHALMOLOGY | Facility: CLINIC | Age: 85
End: 2020-04-20
Payer: MEDICARE

## 2020-04-20 PROCEDURE — 99441: CPT

## 2020-04-21 ENCOUNTER — APPOINTMENT (OUTPATIENT)
Dept: OPHTHALMOLOGY | Facility: CLINIC | Age: 85
End: 2020-04-21
Payer: MEDICARE

## 2020-04-21 ENCOUNTER — NON-APPOINTMENT (OUTPATIENT)
Age: 85
End: 2020-04-21

## 2020-04-21 PROCEDURE — 92014 COMPRE OPH EXAM EST PT 1/>: CPT

## 2020-04-21 PROCEDURE — 92202 OPSCPY EXTND ON/MAC DRAW: CPT

## 2020-04-21 PROCEDURE — 92134 CPTRZ OPH DX IMG PST SGM RTA: CPT

## 2020-06-22 ENCOUNTER — NON-APPOINTMENT (OUTPATIENT)
Age: 85
End: 2020-06-22

## 2020-06-22 ENCOUNTER — APPOINTMENT (OUTPATIENT)
Dept: OPHTHALMOLOGY | Facility: CLINIC | Age: 85
End: 2020-06-22
Payer: MEDICARE

## 2020-06-22 PROCEDURE — 92134 CPTRZ OPH DX IMG PST SGM RTA: CPT

## 2020-06-22 PROCEDURE — 92012 INTRM OPH EXAM EST PATIENT: CPT

## 2020-07-20 ENCOUNTER — OUTPATIENT (OUTPATIENT)
Dept: OUTPATIENT SERVICES | Facility: HOSPITAL | Age: 85
LOS: 1 days | End: 2020-07-20

## 2020-07-20 ENCOUNTER — RESULT REVIEW (OUTPATIENT)
Age: 85
End: 2020-07-20

## 2020-07-20 ENCOUNTER — APPOINTMENT (OUTPATIENT)
Dept: CT IMAGING | Facility: CLINIC | Age: 85
End: 2020-07-20
Payer: MEDICARE

## 2020-07-20 DIAGNOSIS — Z98.890 OTHER SPECIFIED POSTPROCEDURAL STATES: Chronic | ICD-10-CM

## 2020-07-20 DIAGNOSIS — Z90.12 ACQUIRED ABSENCE OF LEFT BREAST AND NIPPLE: Chronic | ICD-10-CM

## 2020-07-20 DIAGNOSIS — Z41.9 ENCOUNTER FOR PROCEDURE FOR PURPOSES OTHER THAN REMEDYING HEALTH STATE, UNSPECIFIED: Chronic | ICD-10-CM

## 2020-07-20 DIAGNOSIS — Z98.49 CATARACT EXTRACTION STATUS, UNSPECIFIED EYE: Chronic | ICD-10-CM

## 2020-07-20 PROCEDURE — 70491 CT SOFT TISSUE NECK W/DYE: CPT | Mod: 26

## 2020-07-27 ENCOUNTER — APPOINTMENT (OUTPATIENT)
Dept: OTOLARYNGOLOGY | Facility: CLINIC | Age: 85
End: 2020-07-27
Payer: MEDICARE

## 2020-07-27 VITALS
TEMPERATURE: 97.6 F | DIASTOLIC BLOOD PRESSURE: 93 MMHG | OXYGEN SATURATION: 98 % | BODY MASS INDEX: 23.04 KG/M2 | WEIGHT: 130 LBS | HEART RATE: 87 BPM | SYSTOLIC BLOOD PRESSURE: 150 MMHG | HEIGHT: 63 IN

## 2020-07-27 DIAGNOSIS — C06.9 MALIGNANT NEOPLASM OF MOUTH, UNSPECIFIED: ICD-10-CM

## 2020-07-27 PROCEDURE — 99214 OFFICE O/P EST MOD 30 MIN: CPT

## 2020-07-28 PROBLEM — C06.9 CANCER OF ORAL CAVITY: Status: ACTIVE | Noted: 2020-07-28

## 2020-07-28 NOTE — REASON FOR VISIT
[Subsequent Evaluation] : a subsequent evaluation for [FreeTextEntry2] : oral cavity cancer surveillance

## 2020-07-28 NOTE — ASSESSMENT
[FreeTextEntry1] : 85F nonsmoker with hx of T1N1 right alveolar ridge SCC s/p 6/9/2017 R marginal mandibulectomy and R SND by Dr. Bright and Dr. Maguire. Patient refused XRT. She now presents with recurrent disease in the right gingivobuccal region on imaging and exam.\par \par - Reviewed imaging and exam findings with patient.\par - PET/CT for staging.\par - Recommend right segmental mandibulectomy with fibula free flap, 2 week inpatient stay. Pt resistant to fibula free  flap due to concerns of perceived pain and difficulty walking afterward.\par - Will need to see reconstructive surgeon pre-op, given contact info for Jey Magaña.\par - Pt continues to refuse any RT.  She had RT for breast cancer and refuses to have it again.  Depending on final pathology, RT may or may not be indicated.  We will discuss any necessary post-op treatment and RBAR at that time, and it will ultimately be the patient's decision. \par - PST given.\par - Will present at tumor board.\par - Will f/u with patient after PET/CT and tumor board.\par - Pt verbalized understanding of above.

## 2020-07-28 NOTE — DATA REVIEWED
[de-identified] : EXAM:  CT NECK SOFT TISSUE IC\par \par PROCEDURE DATE:  07/20/2020\par \par INTERPRETATION:  Technique: A thin section axial acquisition was performed from the skull base to the thoracic inlet.  The data was reformatted in the sagittal, coronal and axial planes.\par \par Contrast: 96 cc of Omnipaque 350 with for discarded.\par \par Clinical Information: History of right mandible T1 N1 squamous cell carcinoma status post resection and neck dissection. Surveillance imaging.\par \par Prior Studies: The current study is compared with most recent prior neck CT of 5/2017, as well as multiple intervening PET/CT studies, the most recent from 9/13/2019.\par \par Findings:\par \par *  Aerodigestive Tract: On comparison with the most recent previous CT neck of 5/10/2017, there has been interval development of an enhancing right gingivobuccal mass with inferior extent at the segmental mandibulectomy site and craniad extent lateral to the right maxillary alveolar ridge. Although dental artifact hampers complete visualization, craniocaudad extent is estimated at 3 cm with a thickness of approximately 12 mm. No new mandibular or maxillary bone erosion is suspected. There is no identified invasion of the right  space or floor of mouth.\par \par *  Lymph Nodes: Suspicious lymph nodes are identified in the right external jugular chain overlying the anterior margin of the sternocleidomastoid muscle as well as in right level 2. A cluster of enlarged lymph nodes in right level 5 demonstrates central areas of low attenuation and are felt highly suspicious. There has been interval mild enlargement of a 1A lymph node from 7 to 8 mm as well as mild enlargement of lymph nodes in left level 1B, felt indeterminate. Several mildly prominent lymph nodes are identified in the bilateral maxilla, left greater than right as well as in the mediastinum.\par \par *  Salivary Glands: Mildly prominent and multinodular parotid glands. Right submandibular gland has presumably been resected.\par \par *  Thyroid Gland: Stable large left lobe mass.\par \par *  Orbits: No masses.\par \par *  Brain: Included portions are grossly unremarkable.\par \par *  Skull Base: Intact.\par \par *  Paranasal Sinuses: Bilateral maxillary alveolar polyps or retention cysts.\par \par *  Mastoids: Clear.\par \par *  Cervical Spine: No destructive lesion of bone.\par \par *  Lung Apices: No large apical lung mass.\par \par \par IMPRESSION:\par \par There has been interval development of a large right gingivobuccal mass, presumed recurrence of previous oral cavity carcinoma. While there is suboptimal visualization secondary to dental artifact, estimated maximal tumor dimension is approximately 3 cm in the craniocaudad plane with tumor thickness and depth of invasion estimated at 12 mm. Pathologic lymph nodes are identified in right levels 2 and 5 as well as in the external jugular chain, with additional suspicious lymph nodes in the submental region and left level 1B. There are additional mildly prominent lymph nodes in left level 5 as well as in the bilateral axillae. PET/CT is recommended for more accurate mapping of con disease.\par \par JENNY MORENO M.D., ATTENDING RADIOLOGIST

## 2020-07-28 NOTE — PHYSICAL EXAM
[Midline] : trachea located in midline position [Normal] : orientation to person, place, and time: normal [de-identified] : ND scar healed well [de-identified] : Right marginal mandibulectomy, 3x3 cm submucosal recurrence on the marginal mandibulectomy site, probably extending to the posterior maxillary alveola, involves mucosa, but does not extend to skin

## 2020-07-28 NOTE — HISTORY OF PRESENT ILLNESS
[de-identified] : 85F nonsmoker was treated for T1N1 right alveolar ridge SCC in 6/9/2017 with R marginal mandibulectomy and R SND by Dr. Bright and Dr. Maguire. Patient refused XRT. \par \par Last PET scan in September 2019: Increased FDG activity within 0.8 cm soft tissue density inferior to right parotid gland which may represent a periparotid lymph node which is stable in size from prior exam. Given the stability in size and the location, the findings are favored to represent inflammatory changes. \par \par 10/2019 FNA of 0.8 cm lymph node inferior to the right parotid tail and 1.3 cm adjacent more superiorly located lymph node at level 2 were both negative for malignancy.\par \par PET scan also showed a stable 3 cm left thyroid nodule.  She had followed with Dr. Bright for thyroid nodules, there is an [facility] US thyroid 11/2008 showing 3 cm mid to lower pole nodule and 1.1 cm upper pole nodule of the left thyroid gland which were biopsied on 10/19/2006, with pathology yielding colloid nodules.\par \par She initially presented on 3/9/2020 for surveillance and was TOM on exam.  CT neck was ordered for surveillance of oral cavity SCC and thyroid nodule.  CT was delayed due to COVID-19 crisis.\par \par 7/20/20 CT neck: large right gingivobuccal mass, presumed recurrent, 3 cm maximal tumor dimension, DOI 12 mm. Pathologic LNs in right levels 2 and 5 and external jugular chain, with additional suspicious LN in submental region and left level 1B, additional mildly prominent LN in left level 5 and b/l axillae. PET/CT recommended. Stable left thyroid mass.\par \par She presents today to discuss CT results and re-evaluation.

## 2020-07-31 ENCOUNTER — OUTPATIENT (OUTPATIENT)
Dept: OUTPATIENT SERVICES | Facility: HOSPITAL | Age: 85
LOS: 1 days | End: 2020-07-31
Payer: MEDICARE

## 2020-07-31 ENCOUNTER — RESULT REVIEW (OUTPATIENT)
Age: 85
End: 2020-07-31

## 2020-07-31 DIAGNOSIS — Z98.890 OTHER SPECIFIED POSTPROCEDURAL STATES: Chronic | ICD-10-CM

## 2020-07-31 DIAGNOSIS — Z98.49 CATARACT EXTRACTION STATUS, UNSPECIFIED EYE: Chronic | ICD-10-CM

## 2020-07-31 DIAGNOSIS — Z41.9 ENCOUNTER FOR PROCEDURE FOR PURPOSES OTHER THAN REMEDYING HEALTH STATE, UNSPECIFIED: Chronic | ICD-10-CM

## 2020-07-31 DIAGNOSIS — Z90.12 ACQUIRED ABSENCE OF LEFT BREAST AND NIPPLE: Chronic | ICD-10-CM

## 2020-07-31 LAB — GLUCOSE BLDC GLUCOMTR-MCNC: 91 MG/DL — SIGNIFICANT CHANGE UP (ref 70–99)

## 2020-07-31 PROCEDURE — 78815 PET IMAGE W/CT SKULL-THIGH: CPT | Mod: 26

## 2020-07-31 PROCEDURE — 78815 PET IMAGE W/CT SKULL-THIGH: CPT

## 2020-07-31 PROCEDURE — A9552: CPT

## 2020-07-31 PROCEDURE — 82962 GLUCOSE BLOOD TEST: CPT

## 2020-08-02 ENCOUNTER — NON-APPOINTMENT (OUTPATIENT)
Age: 85
End: 2020-08-02

## 2020-08-10 ENCOUNTER — APPOINTMENT (OUTPATIENT)
Dept: OTOLARYNGOLOGY | Facility: CLINIC | Age: 85
End: 2020-08-10
Payer: MEDICARE

## 2020-08-10 VITALS
DIASTOLIC BLOOD PRESSURE: 86 MMHG | WEIGHT: 130 LBS | HEIGHT: 63 IN | HEART RATE: 105 BPM | BODY MASS INDEX: 23.04 KG/M2 | SYSTOLIC BLOOD PRESSURE: 138 MMHG | OXYGEN SATURATION: 97 %

## 2020-08-10 DIAGNOSIS — C03.1 MALIGNANT NEOPLASM OF LOWER GUM: ICD-10-CM

## 2020-08-10 DIAGNOSIS — C77.0 SECONDARY AND UNSPECIFIED MALIGNANT NEOPLASM OF LYMPH NODES OF HEAD, FACE AND NECK: ICD-10-CM

## 2020-08-10 PROCEDURE — 99214 OFFICE O/P EST MOD 30 MIN: CPT

## 2020-08-11 ENCOUNTER — OUTPATIENT (OUTPATIENT)
Dept: OUTPATIENT SERVICES | Facility: HOSPITAL | Age: 85
LOS: 1 days | End: 2020-08-11

## 2020-08-11 ENCOUNTER — RESULT REVIEW (OUTPATIENT)
Age: 85
End: 2020-08-11

## 2020-08-11 ENCOUNTER — APPOINTMENT (OUTPATIENT)
Dept: CT IMAGING | Facility: CLINIC | Age: 85
End: 2020-08-11
Payer: MEDICARE

## 2020-08-11 DIAGNOSIS — Z98.890 OTHER SPECIFIED POSTPROCEDURAL STATES: Chronic | ICD-10-CM

## 2020-08-11 DIAGNOSIS — Z90.12 ACQUIRED ABSENCE OF LEFT BREAST AND NIPPLE: Chronic | ICD-10-CM

## 2020-08-11 DIAGNOSIS — Z98.49 CATARACT EXTRACTION STATUS, UNSPECIFIED EYE: Chronic | ICD-10-CM

## 2020-08-11 DIAGNOSIS — Z41.9 ENCOUNTER FOR PROCEDURE FOR PURPOSES OTHER THAN REMEDYING HEALTH STATE, UNSPECIFIED: Chronic | ICD-10-CM

## 2020-08-11 PROCEDURE — 73706 CT ANGIO LWR EXTR W/O&W/DYE: CPT | Mod: 26,50

## 2020-08-13 PROBLEM — C77.0 METASTASIS TO HEAD AND NECK LYMPH NODE: Status: ACTIVE | Noted: 2017-06-01

## 2020-08-13 PROBLEM — C03.1: Status: ACTIVE | Noted: 2017-06-01

## 2020-08-13 NOTE — HISTORY OF PRESENT ILLNESS
[de-identified] : 85F nonsmoker was treated for T1N1 right alveolar ridge SCC in 6/9/2017 with R marginal mandibulectomy and R SND by Dr. Bright and Dr. Maguire. Patient refused XRT. \par \par Last PET scan in September 2019: Increased FDG activity within 0.8 cm soft tissue density inferior to right parotid gland which may represent a periparotid lymph node which is stable in size from prior exam. Given the stability in size and the location, the findings are favored to represent inflammatory changes. \par \par 10/2019 FNA of 0.8 cm lymph node inferior to the right parotid tail and 1.3 cm adjacent more superiorly located lymph node at level 2 were both negative for malignancy.\par \par PET scan also showed a stable 3 cm left thyroid nodule. She had followed with Dr. Bright for thyroid nodules, there is an [facility] US thyroid 11/2008 showing 3 cm mid to lower pole nodule and 1.1 cm upper pole nodule of the left thyroid gland which were biopsied on 10/19/2006, with pathology yielding colloid nodules.\par \par She initially presented on 3/9/2020 for surveillance and was TOM on exam. CT neck was ordered for surveillance of oral cavity SCC and thyroid nodule. CT was delayed due to COVID-19 crisis.\par \par 7/20/20 CT neck: large right gingivobuccal mass, presumed recurrent, 3 cm maximal tumor dimension, DOI 12 mm. Pathologic LNs in right levels 2 and 5 and external jugular chain, with additional suspicious LN in submental region and left level 1B, additional mildly prominent LN in left level 5 and b/l axillae. PET/CT recommended. Stable left thyroid mass.\par \par 7/31/20 PET/CT:\par 1.  Intensely FDG-avid focus in the right gingivobuccal area, likely in the prior resection site, corresponding to enhancing lesion on CT, consistent with local recurrence.\par 2.  FDG-avid right cervical lymph nodes, grossly increased in size and FDG avidity, suspicious for metastases.\par 3.  Mildly FDG-avid left level 1 cervical lymph node, indeterminate.\par 4.  Mildly FDG avid probable hilar node, may be reactive.\par 5.  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  She has seen Dr. Magaña pre-operatively and [place] [facility] [place].

## 2020-08-13 NOTE — ASSESSMENT
[FreeTextEntry1] : 85F nonsmoker with hx of T1N1 right alveolar ridge SCC s/p 6/9/2017 R marginal mandibulectomy and R SND by Dr. Bright and Dr. Maguire. Patient refused XRT. She now presents with recurrent disease in the right gingivobuccal region on imaging and exam.\par \par Plan: \par - Discussed CT and PET/CT results with patient.\par - Pt will need FNA of b/l cervical LNs prior to surgery - pt wants to have it done by Marisela Nieves at [facility].\par - Scheduled for right segmental mandibulectomy, ND, and free flap on 8/27/20.\par - Risks of surgery explained during office visit including but not limited to risk of bleeding, risk of infection, risk of injury to the vessels of the neck, risk of injury to the nerves of the neck and lower lip, risk of injury to the nerves that move the vocal cords, post op complications such as hematoma, surgical site infection, and prolonged hospital stay. pt verbalized understanding.\par - Pt lives alone and does not have support.  Offered social work referral, patient declined.\par - PST given. Pt will need medical and cardiac clearance - she is on Eliquis which would have to be stopped prior to surgery.\par - cc Dr. Gandhi PET results per pt request.\par

## 2020-08-13 NOTE — PHYSICAL EXAM
[Midline] : trachea located in midline position [Normal] : orientation to person, place, and time: normal [de-identified] : ND scar healed well [de-identified] : Right marginal mandibulectomy, 3x3 cm submucosal recurrence on the marginal mandibulectomy site, probably extending to the posterior maxillary alveola, involves mucosa, but does not extend to skin

## 2020-08-20 ENCOUNTER — APPOINTMENT (OUTPATIENT)
Dept: ULTRASOUND IMAGING | Facility: HOSPITAL | Age: 85
End: 2020-08-20

## 2020-08-31 ENCOUNTER — APPOINTMENT (OUTPATIENT)
Dept: OPHTHALMOLOGY | Facility: CLINIC | Age: 85
End: 2020-08-31
Payer: MEDICARE

## 2020-08-31 ENCOUNTER — NON-APPOINTMENT (OUTPATIENT)
Age: 85
End: 2020-08-31

## 2020-08-31 PROCEDURE — 92201 OPSCPY EXTND RTA DRAW UNI/BI: CPT

## 2020-08-31 PROCEDURE — 92012 INTRM OPH EXAM EST PATIENT: CPT

## 2020-09-01 ENCOUNTER — APPOINTMENT (OUTPATIENT)
Dept: OPHTHALMOLOGY | Facility: CLINIC | Age: 85
End: 2020-09-01
Payer: MEDICARE

## 2020-09-01 ENCOUNTER — NON-APPOINTMENT (OUTPATIENT)
Age: 85
End: 2020-09-01

## 2020-09-01 PROCEDURE — 92083 EXTENDED VISUAL FIELD XM: CPT

## 2020-09-01 PROCEDURE — 92014 COMPRE OPH EXAM EST PT 1/>: CPT

## 2020-11-22 NOTE — DIETITIAN INITIAL EVALUATION ADULT. - ENTERAL KCAL
If you are a smoker, it is important for your health to stop smoking. Please be aware that second hand smoke is also harmful.
[id number]

## 2021-01-04 ENCOUNTER — APPOINTMENT (OUTPATIENT)
Dept: OPHTHALMOLOGY | Facility: CLINIC | Age: 86
End: 2021-01-04

## 2021-02-11 ENCOUNTER — EMERGENCY (EMERGENCY)
Facility: HOSPITAL | Age: 86
LOS: 1 days | Discharge: HOME | End: 2021-02-11
Attending: EMERGENCY MEDICINE | Admitting: EMERGENCY MEDICINE
Payer: MEDICARE

## 2021-02-11 VITALS
HEART RATE: 100 BPM | RESPIRATION RATE: 18 BRPM | DIASTOLIC BLOOD PRESSURE: 96 MMHG | OXYGEN SATURATION: 99 % | TEMPERATURE: 99 F | SYSTOLIC BLOOD PRESSURE: 151 MMHG

## 2021-02-11 VITALS
HEART RATE: 102 BPM | RESPIRATION RATE: 18 BRPM | OXYGEN SATURATION: 97 % | SYSTOLIC BLOOD PRESSURE: 158 MMHG | HEIGHT: 63 IN | WEIGHT: 121.92 LBS | TEMPERATURE: 98 F | DIASTOLIC BLOOD PRESSURE: 102 MMHG

## 2021-02-11 DIAGNOSIS — Z79.899 OTHER LONG TERM (CURRENT) DRUG THERAPY: ICD-10-CM

## 2021-02-11 DIAGNOSIS — S09.90XA UNSPECIFIED INJURY OF HEAD, INITIAL ENCOUNTER: ICD-10-CM

## 2021-02-11 DIAGNOSIS — Z98.890 OTHER SPECIFIED POSTPROCEDURAL STATES: Chronic | ICD-10-CM

## 2021-02-11 DIAGNOSIS — Z79.891 LONG TERM (CURRENT) USE OF OPIATE ANALGESIC: ICD-10-CM
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
## 2021-02-11 DIAGNOSIS — Z98.49 CATARACT EXTRACTION STATUS, UNSPECIFIED EYE: Chronic | ICD-10-CM

## 2021-02-11 DIAGNOSIS — Z88.2 ALLERGY STATUS TO SULFONAMIDES: ICD-10-CM

## 2021-02-11 DIAGNOSIS — Z98.890 OTHER SPECIFIED POSTPROCEDURAL STATES: ICD-10-CM
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
## 2021-02-11 DIAGNOSIS — E03.9 HYPOTHYROIDISM, UNSPECIFIED: ICD-10-CM

## 2021-02-11 DIAGNOSIS — Z90.12 ACQUIRED ABSENCE OF LEFT BREAST AND NIPPLE: Chronic | ICD-10-CM
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
## 2021-02-11 DIAGNOSIS — Z41.9 ENCOUNTER FOR PROCEDURE FOR PURPOSES OTHER THAN REMEDYING HEALTH STATE, UNSPECIFIED: Chronic | ICD-10-CM

## 2021-02-11 PROCEDURE — 70450 CT HEAD/BRAIN W/O DYE: CPT

## 2021-02-11 PROCEDURE — 70480 CT ORBIT/EAR/FOSSA W/O DYE: CPT | Mod: 26,59,MG

## 2021-02-11 PROCEDURE — 99284 EMERGENCY DEPT VISIT MOD MDM: CPT | Mod: 25

## 2021-02-11 PROCEDURE — G1004: CPT

## 2021-02-11 PROCEDURE — 99284 EMERGENCY DEPT VISIT MOD MDM: CPT

## 2021-02-11 PROCEDURE — 70480 CT ORBIT/EAR/FOSSA W/O DYE: CPT

## 2021-02-11 PROCEDURE — 70450 CT HEAD/BRAIN W/O DYE: CPT | Mod: 26,ME

## 2021-02-11 NOTE — ED PROVIDER NOTE - CHPI ED SYMPTOMS NEG
no HA, no vision changes, no numbness, no weakness, no tingling, no nausea, no vomiting, no new neck pain, no new back , no CP, no nausea, so SOB

## 2021-02-11 NOTE — ED PROVIDER NOTE - PHYSICAL EXAMINATION
VITAL SIGNS: I have reviewed nursing notes and confirm.  CONSTITUTIONAL: Well-developed; well-nourished; in no acute distress.   SKIN:  see HEAD exam   HEAD:  Head: swelling and ecchymosis of left periorbital regio, mild ttp, healing lac to left eyebrow.  EYES: EOM intact; conjunctiva and sclera clear.  ENT: No nasal discharge; airway clear.   NECK: Supple; non tender.  CARD: S1, S2 normal; no murmurs, gallops, or rubs. Regular rate and rhythm.   RESP:  Clear to auscultation b/l, no wheezes, rales or rhonchi.  ABD: Normal bowel sounds; soft; non-distended; non-tender; no guarding/ rebound.  EXT: Normal ROM. No clubbing, cyanosis or edema. 2+ pulses to b/l ue/le.  NEURO: nonfocal deficit, cranial nerves 2-12 intact, motor skills intact in upper extremities bilaterally.  PSYCH: Cooperative, mood and affect appropriate. VITAL SIGNS: I have reviewed nursing notes and confirm.  CONSTITUTIONAL: Well-developed; well-nourished; in no acute distress.   SKIN:  see HEAD exam   HEAD:  Head: swelling and ecchymosis of left periorbital regio, mild ttp, healing lac to left eyebrow.  EYES: PERRLA. EOM intact; conjunctiva and sclera clear.  ENT: No nasal discharge; airway clear.   NECK: Supple; non tender.  CARD: S1, S2 normal; no murmurs, gallops, or rubs. Regular rate and rhythm.   RESP:  Clear to auscultation b/l, no wheezes, rales or rhonchi.  ABD: Normal bowel sounds; soft; non-distended; non-tender; no guarding/ rebound.  EXT: Normal ROM. No clubbing, cyanosis or edema. 2+ pulses to b/l ue/le.  NEURO: A&O x 3, cranial nerves 2-12 intact, motor and sensation intact and equal bilaterally. Neg pronator drift.  PSYCH: Cooperative, mood and affect appropriate.

## 2021-02-11 NOTE — ED ADULT TRIAGE NOTE - CHIEF COMPLAINT QUOTE
Pt tripped and fell 5 days ago, hit head, denies loc, currently taking eliquis. Pt has swelling and lac repair to left eye, reports increased swelling yesterday. Pt denies headache, dizziness, n/v, vision changes, weakness. Pt ambulating with walker at baseline.

## 2021-02-11 NOTE — ED PROVIDER NOTE - CLINICAL SUMMARY MEDICAL DECISION MAKING FREE TEXT BOX
Impression: s/p fall 5 d ago, seen at  and had sutures placed to left eyebrow, here today for neuroimaging as no ct head performed at the time of evaluation and pt is on ac. Neuro exam non-focal. No c-spine tenderness. CT head neg for acute bleed. ED evaluation and management discussed with the patient in detail. Pt has appt w/ [facility] in 4 days already scheduled.  Strict ED return instructions discussed in detail and patient given the opportunity to ask any questions about their discharge diagnosis and instructions. Patient verbalized understanding. Impression: s/p fall 5 d ago, seen at  and had sutures placed to left eyebrow, here today for neuroimaging as no ct head performed at the time of initial evaluation and pt is on ac. Neuro exam non-focal. No c-spine tenderness. CT head neg for acute bleed. ED evaluation and management discussed with the patient in detail. Pt has appt w/ [facility] in 4 days already scheduled.  Strict ED return instructions discussed in detail and patient given the opportunity to ask any questions about their discharge diagnosis and instructions. Patient verbalized understanding.

## 2021-02-11 NOTE — ED PROVIDER NOTE - PSH
S/P ablation of atrial fibrillation    S/P cataract surgery    S/P knee surgery  Right knee arthroscopy x1  S/P knee surgery  Left knee Arthrocopy X3  S/P lumpectomy, left breast    Surgery, elective  Thumb surgery bilateral

## 2021-02-11 NOTE — ED PROVIDER NOTE - OBJECTIVE STATEMENT
86F with a with a pmhx of of a-fib,  Eliquis compliance presents c/o of an accidental fall x5 days ago with sustained head injury. patient was seen at [facility], where patient received suture of left eyebrow, with no head CT. Referred to ED for imaging of head, as pt is on Eliquis. Denies HA, vision changes, numbness, weakness, tingling, nausea, vomiting, new neck pain, new back , pain, CP, SOB, or any other acute medical complaints at this time. 85yo F with pmhx of a-fib on ac, who presents s/p accidental fall x5 days ago with sustained head injury. patient was seen at [facility], where patient received sutures to left eyebrow, however no head CT. Pt referred to ED for imaging of head, as pt is on Eliquis. Denies HA, dizziness, vision changes, numbness, weakness, tingling, nausea, vomiting, new neck/ back pain, CP, SOB, palp or any other acute medical complaints at this time.

## 2021-02-11 NOTE — ED ADULT NURSE NOTE - CHPI ED NUR SYMPTOMS NEG
no abrasion/no confusion/no deformity/no fever/no loss of consciousness/no numbness/no tingling/no vomiting/no weakness
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
## 2021-02-11 NOTE — ED PROVIDER NOTE - NSFOLLOWUPINSTRUCTIONS_ED_ALL_ED_FT
Apply ice to your face.  Take tylenol as needed for any pain.  Follow up with your primary care physician for re-evaluation.  Return to er for any new or worsening symptoms (severe headache, dizziness, nausea, vomiting, numbness, tingling, weakness....).                 Log Out.      [employer] CareNotes®     :  [facility]  	                       HEAD INJURY - AfterCare(R) Instructions(ER/ED)           Head Injury    WHAT YOU NEED TO KNOW:    A head injury can include your scalp, face, skull, or brain and range from mild to severe. Effects can appear immediately after the injury or develop later. The effects may last a short time or be permanent. Healthcare providers may want to check your recovery over time. Treatment may change as you recover or develop new health problems from the head injury.    DISCHARGE INSTRUCTIONS:    Call your local emergency number (911 in the [place]), or have someone else call if:   •You cannot be woken.      •You have a seizure.      •You stop responding to others or you faint.      •You have blurry or double vision.      •Your speech becomes slurred or confused.      •You have arm or leg weakness, loss of feeling, or new problems with coordination.      •Your pupils are larger than usual, or one pupil is a different size than the other.      •You have blood or clear fluid coming out of your ears or nose.      Return to the emergency department if:   •You have repeated or forceful vomiting.      •You feel confused.      •Your headache gets worse or becomes severe.      •You or someone caring for you notices that you are harder to wake than usual.      Call your doctor if:   •Your symptoms last longer than 6 weeks after the injury.      •You have questions or concerns about your condition or care.      Medicines:   •Acetaminophen decreases pain and fever. It is available without a doctor's order. Ask how much to take and how often to take it. Follow directions. Read the labels of all other medicines you are using to see if they also contain acetaminophen, or ask your doctor or pharmacist. Acetaminophen can cause liver damage if not taken correctly. Do not use more than 4 grams (4,000 milligrams) total of acetaminophen in one day.       •Take your medicine as directed. Contact your healthcare provider if you think your medicine is not helping or if you have side effects. Tell him or her if you are allergic to any medicine. Keep a list of the medicines, vitamins, and herbs you take. Include the amounts, and when and why you take them. Bring the list or the pill bottles to follow-up visits. Carry your medicine list with you in case of an emergency.      Self-care:   •Rest or do quiet activities. Limit your time watching TV, using the computer, or doing tasks that require a lot of thinking. Slowly return to your normal activities as directed. Do not play sports or do activities that may cause you to get hit in the head. Ask your healthcare provider when you can return to sports.      •Apply ice on your head for 15 to 20 minutes every hour or as directed. Use an ice pack, or put crushed ice in a plastic bag. Cover it with a towel before you apply it to your skin. Ice helps prevent tissue damage and decreases swelling and pain.      •Have someone stay with you for 24 hours , or as directed. This person can monitor you for problems and call for help if needed. When you are awake, the person should ask you a few questions every few hours to see if you are thinking clearly. An example is to ask your name or address.      Prevent another head injury:   •Wear a helmet that fits properly. Do this when you play sports, or ride a bike, scooter, or skateboard. Helmets help decrease your risk for a serious head injury. Talk to your healthcare provider about other ways you can protect yourself if you play sports.      •Wear your seatbelt every time you are in a car. This helps lower your risk for a head injury if you are in a car accident.      Follow up with your doctor as directed: Write down your questions so you remember to ask them during your visits.       © Copyright [employer] 2021           back to top                          © Copyright [employer] 2021

## 2021-02-11 NOTE — ED PROVIDER NOTE - PATIENT PORTAL LINK FT
You can access the FollowMyHealth Patient Portal offered by [facility] by registering at the following website: [email or web address]. By joining [employer]’s FollowMyHealth portal, you will also be able to view your health information using other applications (apps) compatible with our system.

## 2021-02-11 NOTE — ED ADULT NURSE NOTE - NSIMPLEMENTINTERV_GEN_ALL_ED
Implemented All Fall with Harm Risk Interventions:  [place] to call system. Call bell, personal items and telephone within reach. Instruct patient to call for assistance. Room bathroom lighting operational. Non-slip footwear when patient is off stretcher. Physically safe environment: no spills, clutter or unnecessary equipment. Stretcher in lowest position, wheels locked, appropriate side rails in place. Provide visual cue, wrist band, yellow gown, etc. Monitor gait and stability. Monitor for mental status changes and reorient to person, place, and time. Review medications for side effects contributing to fall risk. Reinforce activity limits and safety measures with patient and family. Provide visual clues: red socks.

## 2021-03-10 ENCOUNTER — APPOINTMENT (OUTPATIENT)
Dept: OPHTHALMOLOGY | Facility: CLINIC | Age: 86
End: 2021-03-10

## 2022-07-21 ENCOUNTER — NON-APPOINTMENT (OUTPATIENT)
Age: 87
End: 2022-07-21

## 2022-07-21 ENCOUNTER — APPOINTMENT (OUTPATIENT)
Dept: OPHTHALMOLOGY | Facility: CLINIC | Age: 87
End: 2022-07-21

## 2022-07-21 PROCEDURE — 92134 CPTRZ OPH DX IMG PST SGM RTA: CPT

## 2022-07-21 PROCEDURE — 67028 INJECTION EYE DRUG: CPT | Mod: LT

## 2022-07-21 PROCEDURE — 92014 COMPRE OPH EXAM EST PT 1/>: CPT | Mod: 25

## 2022-08-24 ENCOUNTER — APPOINTMENT (OUTPATIENT)
Dept: OPHTHALMOLOGY | Facility: CLINIC | Age: 87
End: 2022-08-24

## 2022-08-24 ENCOUNTER — NON-APPOINTMENT (OUTPATIENT)
Age: 87
End: 2022-08-24

## 2022-08-24 PROCEDURE — 67028 INJECTION EYE DRUG: CPT | Mod: LT

## 2022-08-24 PROCEDURE — 92134 CPTRZ OPH DX IMG PST SGM RTA: CPT

## 2022-09-21 ENCOUNTER — APPOINTMENT (OUTPATIENT)
Dept: OPHTHALMOLOGY | Facility: CLINIC | Age: 87
End: 2022-09-21

## 2022-09-28 ENCOUNTER — APPOINTMENT (OUTPATIENT)
Dept: OPHTHALMOLOGY | Facility: CLINIC | Age: 87
End: 2022-09-28

## 2022-09-28 ENCOUNTER — NON-APPOINTMENT (OUTPATIENT)
Age: 87
End: 2022-09-28

## 2022-09-28 PROCEDURE — 92012 INTRM OPH EXAM EST PATIENT: CPT | Mod: 25

## 2022-09-28 PROCEDURE — 92134 CPTRZ OPH DX IMG PST SGM RTA: CPT

## 2022-09-28 PROCEDURE — 67028 INJECTION EYE DRUG: CPT | Mod: RT

## 2022-10-26 ENCOUNTER — APPOINTMENT (OUTPATIENT)
Dept: OPHTHALMOLOGY | Facility: CLINIC | Age: 87
End: 2022-10-26

## 2022-10-26 ENCOUNTER — NON-APPOINTMENT (OUTPATIENT)
Age: 87
End: 2022-10-26

## 2022-10-26 PROCEDURE — 67028 INJECTION EYE DRUG: CPT | Mod: 50

## 2022-10-26 PROCEDURE — 92134 CPTRZ OPH DX IMG PST SGM RTA: CPT

## 2022-12-07 ENCOUNTER — NON-APPOINTMENT (OUTPATIENT)
Age: 87
End: 2022-12-07

## 2022-12-07 ENCOUNTER — APPOINTMENT (OUTPATIENT)
Dept: OPHTHALMOLOGY | Facility: CLINIC | Age: 87
End: 2022-12-07

## 2022-12-07 PROCEDURE — 67028 INJECTION EYE DRUG: CPT | Mod: 50

## 2022-12-07 PROCEDURE — 92134 CPTRZ OPH DX IMG PST SGM RTA: CPT

## 2023-02-01 ENCOUNTER — APPOINTMENT (OUTPATIENT)
Dept: OPHTHALMOLOGY | Facility: CLINIC | Age: 88
End: 2023-02-01
Payer: MEDICARE

## 2023-02-01 ENCOUNTER — NON-APPOINTMENT (OUTPATIENT)
Age: 88
End: 2023-02-01

## 2023-02-01 PROCEDURE — 92134 CPTRZ OPH DX IMG PST SGM RTA: CPT

## 2023-02-01 PROCEDURE — 67028 INJECTION EYE DRUG: CPT | Mod: 50

## 2023-03-15 ENCOUNTER — APPOINTMENT (OUTPATIENT)
Dept: OPHTHALMOLOGY | Facility: CLINIC | Age: 88
End: 2023-03-15

## 2023-03-16 ENCOUNTER — NON-APPOINTMENT (OUTPATIENT)
Age: 88
End: 2023-03-16

## 2023-03-16 ENCOUNTER — APPOINTMENT (OUTPATIENT)
Dept: OPHTHALMOLOGY | Facility: CLINIC | Age: 88
End: 2023-03-16
Payer: MEDICARE

## 2023-03-16 PROCEDURE — 67028 INJECTION EYE DRUG: CPT | Mod: 50

## 2023-03-16 PROCEDURE — 92012 INTRM OPH EXAM EST PATIENT: CPT | Mod: 25

## 2023-03-16 PROCEDURE — 92134 CPTRZ OPH DX IMG PST SGM RTA: CPT

## 2023-05-04 ENCOUNTER — NON-APPOINTMENT (OUTPATIENT)
Age: 88
End: 2023-05-04

## 2023-05-04 ENCOUNTER — APPOINTMENT (OUTPATIENT)
Dept: OPHTHALMOLOGY | Facility: CLINIC | Age: 88
End: 2023-05-04
Payer: MEDICARE

## 2023-05-04 PROCEDURE — 92134 CPTRZ OPH DX IMG PST SGM RTA: CPT

## 2023-05-04 PROCEDURE — 67028 INJECTION EYE DRUG: CPT | Mod: 50

## 2023-06-29 ENCOUNTER — APPOINTMENT (OUTPATIENT)
Dept: OPHTHALMOLOGY | Facility: CLINIC | Age: 88
End: 2023-06-29
Payer: MEDICARE

## 2023-06-29 ENCOUNTER — NON-APPOINTMENT (OUTPATIENT)
Age: 88
End: 2023-06-29

## 2023-06-29 PROCEDURE — 92134 CPTRZ OPH DX IMG PST SGM RTA: CPT

## 2023-06-29 PROCEDURE — 67028 INJECTION EYE DRUG: CPT | Mod: 50

## 2023-08-31 ENCOUNTER — APPOINTMENT (OUTPATIENT)
Dept: OPHTHALMOLOGY | Facility: CLINIC | Age: 88
End: 2023-08-31
Payer: MEDICARE

## 2023-08-31 ENCOUNTER — NON-APPOINTMENT (OUTPATIENT)
Age: 88
End: 2023-08-31

## 2023-08-31 PROCEDURE — 67028 INJECTION EYE DRUG: CPT | Mod: 50

## 2023-08-31 PROCEDURE — 92134 CPTRZ OPH DX IMG PST SGM RTA: CPT

## 2023-10-01 PROBLEM — C44.301 MALIGNANT NEOPLASM OF SKIN OF EXTERNAL NOSE: Status: ACTIVE | Noted: 2017-06-07

## 2023-11-02 ENCOUNTER — APPOINTMENT (OUTPATIENT)
Dept: OPHTHALMOLOGY | Facility: CLINIC | Age: 88
End: 2023-11-02
Payer: MEDICARE

## 2023-11-02 ENCOUNTER — NON-APPOINTMENT (OUTPATIENT)
Age: 88
End: 2023-11-02

## 2023-11-02 PROCEDURE — 92134 CPTRZ OPH DX IMG PST SGM RTA: CPT

## 2023-11-02 PROCEDURE — 92012 INTRM OPH EXAM EST PATIENT: CPT | Mod: 25

## 2023-11-02 PROCEDURE — 67028 INJECTION EYE DRUG: CPT | Mod: 50

## 2024-01-03 ENCOUNTER — NON-APPOINTMENT (OUTPATIENT)
Age: 89
End: 2024-01-03

## 2024-01-03 ENCOUNTER — APPOINTMENT (OUTPATIENT)
Dept: OPHTHALMOLOGY | Facility: CLINIC | Age: 89
End: 2024-01-03
Payer: MEDICARE

## 2024-01-03 PROCEDURE — 92134 CPTRZ OPH DX IMG PST SGM RTA: CPT

## 2024-01-03 PROCEDURE — 67028 INJECTION EYE DRUG: CPT | Mod: 50

## 2024-02-28 ENCOUNTER — APPOINTMENT (OUTPATIENT)
Dept: OPHTHALMOLOGY | Facility: CLINIC | Age: 89
End: 2024-02-28
Payer: MEDICARE

## 2024-02-28 ENCOUNTER — NON-APPOINTMENT (OUTPATIENT)
Age: 89
End: 2024-02-28

## 2024-02-28 PROCEDURE — 92012 INTRM OPH EXAM EST PATIENT: CPT | Mod: 25

## 2024-02-28 PROCEDURE — 92134 CPTRZ OPH DX IMG PST SGM RTA: CPT

## 2024-02-28 PROCEDURE — 67028 INJECTION EYE DRUG: CPT | Mod: 50

## 2024-05-01 ENCOUNTER — NON-APPOINTMENT (OUTPATIENT)
Age: 89
End: 2024-05-01

## 2024-05-01 ENCOUNTER — APPOINTMENT (OUTPATIENT)
Dept: OPHTHALMOLOGY | Facility: CLINIC | Age: 89
End: 2024-05-01
Payer: MEDICARE

## 2024-05-01 PROCEDURE — 67028 INJECTION EYE DRUG: CPT | Mod: 50

## 2024-05-01 PROCEDURE — 92134 CPTRZ OPH DX IMG PST SGM RTA: CPT

## 2024-07-03 ENCOUNTER — NON-APPOINTMENT (OUTPATIENT)
Age: 89
End: 2024-07-03

## 2024-07-03 ENCOUNTER — APPOINTMENT (OUTPATIENT)
Dept: OPHTHALMOLOGY | Facility: CLINIC | Age: 89
End: 2024-07-03
Payer: MEDICARE

## 2024-07-03 PROCEDURE — 67028 INJECTION EYE DRUG: CPT | Mod: 50

## 2024-07-03 PROCEDURE — 92134 CPTRZ OPH DX IMG PST SGM RTA: CPT

## 2024-07-03 PROCEDURE — 92012 INTRM OPH EXAM EST PATIENT: CPT | Mod: 25

## 2024-08-28 ENCOUNTER — APPOINTMENT (OUTPATIENT)
Dept: OPHTHALMOLOGY | Facility: CLINIC | Age: 89
End: 2024-08-28
Payer: MEDICARE

## 2024-08-28 ENCOUNTER — NON-APPOINTMENT (OUTPATIENT)
Age: 89
End: 2024-08-28

## 2024-08-28 PROCEDURE — 92134 CPTRZ OPH DX IMG PST SGM RTA: CPT

## 2024-08-28 PROCEDURE — 92012 INTRM OPH EXAM EST PATIENT: CPT | Mod: 25

## 2024-08-28 PROCEDURE — 67028 INJECTION EYE DRUG: CPT | Mod: 50

## 2024-10-09 ENCOUNTER — NON-APPOINTMENT (OUTPATIENT)
Age: 89
End: 2024-10-09

## 2024-10-09 ENCOUNTER — APPOINTMENT (OUTPATIENT)
Dept: OPHTHALMOLOGY | Facility: CLINIC | Age: 89
End: 2024-10-09
Payer: MEDICARE

## 2024-10-09 PROCEDURE — 92134 CPTRZ OPH DX IMG PST SGM RTA: CPT

## 2024-10-09 PROCEDURE — 92012 INTRM OPH EXAM EST PATIENT: CPT

## 2024-10-25 ENCOUNTER — NON-APPOINTMENT (OUTPATIENT)
Age: 89
End: 2024-10-25

## 2024-10-25 ENCOUNTER — APPOINTMENT (OUTPATIENT)
Dept: OPHTHALMOLOGY | Facility: CLINIC | Age: 89
End: 2024-10-25
Payer: MEDICARE

## 2024-10-25 PROCEDURE — 92014 COMPRE OPH EXAM EST PT 1/>: CPT

## 2024-10-25 PROCEDURE — 92134 CPTRZ OPH DX IMG PST SGM RTA: CPT

## 2024-10-25 PROCEDURE — 92083 EXTENDED VISUAL FIELD XM: CPT

## 2024-10-30 ENCOUNTER — OUTPATIENT (OUTPATIENT)
Dept: OUTPATIENT SERVICES | Facility: HOSPITAL | Age: 89
LOS: 1 days | End: 2024-10-30

## 2024-10-30 ENCOUNTER — APPOINTMENT (OUTPATIENT)
Dept: MRI IMAGING | Facility: CLINIC | Age: 89
End: 2024-10-30
Payer: MEDICARE

## 2024-10-30 ENCOUNTER — RESULT REVIEW (OUTPATIENT)
Age: 89
End: 2024-10-30

## 2024-10-30 DIAGNOSIS — Z98.890 OTHER SPECIFIED POSTPROCEDURAL STATES: Chronic | ICD-10-CM

## 2024-10-30 DIAGNOSIS — Z41.9 ENCOUNTER FOR PROCEDURE FOR PURPOSES OTHER THAN REMEDYING HEALTH STATE, UNSPECIFIED: Chronic | ICD-10-CM

## 2024-10-30 DIAGNOSIS — Z98.49 CATARACT EXTRACTION STATUS, UNSPECIFIED EYE: Chronic | ICD-10-CM

## 2024-10-30 DIAGNOSIS — Z90.12 ACQUIRED ABSENCE OF LEFT BREAST AND NIPPLE: Chronic | ICD-10-CM

## 2024-10-30 PROCEDURE — 70553 MRI BRAIN STEM W/O & W/DYE: CPT | Mod: 26,MH

## 2024-10-30 PROCEDURE — 70543 MRI ORBT/FAC/NCK W/O &W/DYE: CPT | Mod: 26,MH

## 2024-11-04 ENCOUNTER — NON-APPOINTMENT (OUTPATIENT)
Age: 89
End: 2024-11-04

## 2024-11-04 ENCOUNTER — APPOINTMENT (OUTPATIENT)
Dept: OPHTHALMOLOGY | Facility: CLINIC | Age: 89
End: 2024-11-04
Payer: MEDICARE

## 2024-11-04 PROCEDURE — 67028 INJECTION EYE DRUG: CPT | Mod: 50

## 2024-11-04 PROCEDURE — 92134 CPTRZ OPH DX IMG PST SGM RTA: CPT

## 2024-12-06 ENCOUNTER — APPOINTMENT (OUTPATIENT)
Dept: OPHTHALMOLOGY | Facility: CLINIC | Age: 89
End: 2024-12-06
Payer: MEDICARE

## 2024-12-06 ENCOUNTER — NON-APPOINTMENT (OUTPATIENT)
Age: 89
End: 2024-12-06

## 2024-12-06 PROCEDURE — 92014 COMPRE OPH EXAM EST PT 1/>: CPT

## 2024-12-24 PROBLEM — F10.90 ALCOHOL USE: Status: ACTIVE | Noted: 2017-05-31

## 2025-01-03 ENCOUNTER — NON-APPOINTMENT (OUTPATIENT)
Age: 89
End: 2025-01-03

## 2025-01-03 ENCOUNTER — APPOINTMENT (OUTPATIENT)
Dept: OPHTHALMOLOGY | Facility: CLINIC | Age: 89
End: 2025-01-03
Payer: MEDICARE

## 2025-01-03 PROCEDURE — 92012 INTRM OPH EXAM EST PATIENT: CPT | Mod: 25

## 2025-01-03 PROCEDURE — 92134 CPTRZ OPH DX IMG PST SGM RTA: CPT

## 2025-01-03 PROCEDURE — 67028 INJECTION EYE DRUG: CPT | Mod: 50

## 2025-02-27 ENCOUNTER — NON-APPOINTMENT (OUTPATIENT)
Age: 89
End: 2025-02-27

## 2025-02-27 ENCOUNTER — APPOINTMENT (OUTPATIENT)
Dept: OPHTHALMOLOGY | Facility: CLINIC | Age: 89
End: 2025-02-27
Payer: MEDICARE

## 2025-02-27 PROCEDURE — 67028 INJECTION EYE DRUG: CPT | Mod: RT

## 2025-02-27 PROCEDURE — 92134 CPTRZ OPH DX IMG PST SGM RTA: CPT

## 2025-03-27 ENCOUNTER — APPOINTMENT (OUTPATIENT)
Dept: OPHTHALMOLOGY | Facility: CLINIC | Age: 89
End: 2025-03-27
Payer: MEDICARE

## 2025-03-27 ENCOUNTER — NON-APPOINTMENT (OUTPATIENT)
Age: 89
End: 2025-03-27

## 2025-03-27 PROCEDURE — 67028 INJECTION EYE DRUG: CPT | Mod: LT

## 2025-03-27 PROCEDURE — 92134 CPTRZ OPH DX IMG PST SGM RTA: CPT

## 2025-04-04 ENCOUNTER — APPOINTMENT (OUTPATIENT)
Dept: OPHTHALMOLOGY | Facility: CLINIC | Age: 89
End: 2025-04-04

## 2025-05-01 ENCOUNTER — NON-APPOINTMENT (OUTPATIENT)
Age: 89
End: 2025-05-01

## 2025-05-01 ENCOUNTER — APPOINTMENT (OUTPATIENT)
Dept: OPHTHALMOLOGY | Facility: CLINIC | Age: 89
End: 2025-05-01

## 2025-05-01 PROCEDURE — 92134 CPTRZ OPH DX IMG PST SGM RTA: CPT

## 2025-05-01 PROCEDURE — 67028 INJECTION EYE DRUG: CPT | Mod: RT

## 2025-05-01 PROCEDURE — 92012 INTRM OPH EXAM EST PATIENT: CPT | Mod: 25

## 2025-05-30 ENCOUNTER — APPOINTMENT (OUTPATIENT)
Dept: OPHTHALMOLOGY | Facility: CLINIC | Age: 89
End: 2025-05-30
Payer: SELF-PAY

## 2025-05-30 ENCOUNTER — NON-APPOINTMENT (OUTPATIENT)
Age: 89
End: 2025-05-30

## 2025-05-30 PROCEDURE — 92002 INTRM OPH EXAM NEW PATIENT: CPT

## 2025-06-19 ENCOUNTER — NON-APPOINTMENT (OUTPATIENT)
Age: 89
End: 2025-06-19

## 2025-06-19 ENCOUNTER — APPOINTMENT (OUTPATIENT)
Dept: OPHTHALMOLOGY | Facility: CLINIC | Age: 89
End: 2025-06-19
Payer: MEDICARE

## 2025-06-19 PROCEDURE — 92134 CPTRZ OPH DX IMG PST SGM RTA: CPT

## 2025-06-19 PROCEDURE — 67028 INJECTION EYE DRUG: CPT | Mod: LT

## 2025-07-14 ENCOUNTER — NON-APPOINTMENT (OUTPATIENT)
Age: 89
End: 2025-07-14

## 2025-07-14 ENCOUNTER — APPOINTMENT (OUTPATIENT)
Dept: OPHTHALMOLOGY | Facility: CLINIC | Age: 89
End: 2025-07-14
Payer: MEDICARE

## 2025-07-14 PROCEDURE — 67028 INJECTION EYE DRUG: CPT | Mod: RT

## 2025-07-14 PROCEDURE — 92134 CPTRZ OPH DX IMG PST SGM RTA: CPT

## 2025-09-04 ENCOUNTER — APPOINTMENT (OUTPATIENT)
Dept: OPHTHALMOLOGY | Facility: CLINIC | Age: 89
End: 2025-09-04
Payer: MEDICARE

## 2025-09-04 ENCOUNTER — NON-APPOINTMENT (OUTPATIENT)
Age: 89
End: 2025-09-04

## 2025-09-04 PROCEDURE — 92012 INTRM OPH EXAM EST PATIENT: CPT | Mod: 25

## 2025-09-04 PROCEDURE — 92134 CPTRZ OPH DX IMG PST SGM RTA: CPT

## 2025-09-04 PROCEDURE — 67028 INJECTION EYE DRUG: CPT | Mod: LT

## 2025-09-15 ENCOUNTER — NON-APPOINTMENT (OUTPATIENT)
Age: 89
End: 2025-09-15

## 2025-09-15 ENCOUNTER — APPOINTMENT (OUTPATIENT)
Dept: OPHTHALMOLOGY | Facility: CLINIC | Age: 89
End: 2025-09-15
Payer: MEDICARE

## 2025-09-15 PROCEDURE — 67028 INJECTION EYE DRUG: CPT | Mod: RT

## 2025-09-15 PROCEDURE — 92134 CPTRZ OPH DX IMG PST SGM RTA: CPT
